# Patient Record
Sex: FEMALE | Race: WHITE | NOT HISPANIC OR LATINO | Employment: OTHER | URBAN - METROPOLITAN AREA
[De-identification: names, ages, dates, MRNs, and addresses within clinical notes are randomized per-mention and may not be internally consistent; named-entity substitution may affect disease eponyms.]

---

## 2019-05-02 ENCOUNTER — APPOINTMENT (OUTPATIENT)
Dept: CT IMAGING | Facility: HOSPITAL | Age: 84
End: 2019-05-02

## 2019-05-02 ENCOUNTER — HOSPITAL ENCOUNTER (INPATIENT)
Facility: HOSPITAL | Age: 84
LOS: 3 days | Discharge: HOME | End: 2019-05-05
Attending: EMERGENCY MEDICINE | Admitting: INTERNAL MEDICINE

## 2019-05-02 ENCOUNTER — APPOINTMENT (OUTPATIENT)
Dept: CARDIOLOGY | Facility: HOSPITAL | Age: 84
End: 2019-05-02

## 2019-05-02 ENCOUNTER — APPOINTMENT (OUTPATIENT)
Dept: GENERAL RADIOLOGY | Facility: HOSPITAL | Age: 84
End: 2019-05-02

## 2019-05-02 DIAGNOSIS — I50.9 ACUTE ON CHRONIC CONGESTIVE HEART FAILURE, UNSPECIFIED HEART FAILURE TYPE (HCC): Primary | ICD-10-CM

## 2019-05-02 DIAGNOSIS — R09.02 HYPOXIA: ICD-10-CM

## 2019-05-02 PROBLEM — E03.9 HYPOTHYROIDISM (ACQUIRED): Status: ACTIVE | Noted: 2019-05-02

## 2019-05-02 PROBLEM — R06.00 DYSPNEA: Status: ACTIVE | Noted: 2019-05-02

## 2019-05-02 PROBLEM — I48.91 ATRIAL FIBRILLATION (HCC): Status: ACTIVE | Noted: 2019-05-02

## 2019-05-02 PROBLEM — J20.9 ACUTE BRONCHITIS: Status: ACTIVE | Noted: 2019-05-02

## 2019-05-02 PROBLEM — F32.A DEPRESSION: Status: ACTIVE | Noted: 2019-05-02

## 2019-05-02 LAB
ALBUMIN SERPL-MCNC: 3.9 G/DL (ref 3.5–5.2)
ALBUMIN/GLOB SERPL: 1.1 G/DL
ALP SERPL-CCNC: 63 U/L (ref 39–117)
ALT SERPL W P-5'-P-CCNC: 19 U/L (ref 1–33)
ANION GAP SERPL CALCULATED.3IONS-SCNC: 16 MMOL/L
AST SERPL-CCNC: 31 U/L (ref 1–32)
B PARAPERT DNA SPEC QL NAA+PROBE: NOT DETECTED
B PERT DNA SPEC QL NAA+PROBE: NOT DETECTED
BASOPHILS # BLD AUTO: 0.02 10*3/MM3 (ref 0–0.2)
BASOPHILS NFR BLD AUTO: 0.3 % (ref 0–1.5)
BILIRUB SERPL-MCNC: 1.9 MG/DL (ref 0.2–1.2)
BUN BLD-MCNC: 32 MG/DL (ref 8–23)
BUN/CREAT SERPL: 28.6 (ref 7–25)
C PNEUM DNA NPH QL NAA+NON-PROBE: NOT DETECTED
CALCIUM SPEC-SCNC: 9 MG/DL (ref 8.6–10.5)
CHLORIDE SERPL-SCNC: 96 MMOL/L (ref 98–107)
CO2 SERPL-SCNC: 22 MMOL/L (ref 22–29)
CREAT BLD-MCNC: 1.12 MG/DL (ref 0.57–1)
D-LACTATE SERPL-SCNC: 1.9 MMOL/L (ref 0.5–2)
D-LACTATE SERPL-SCNC: 2.2 MMOL/L (ref 0.5–2)
DEPRECATED RDW RBC AUTO: 51 FL (ref 37–54)
EOSINOPHIL # BLD AUTO: 0 10*3/MM3 (ref 0–0.4)
EOSINOPHIL NFR BLD AUTO: 0 % (ref 0.3–6.2)
ERYTHROCYTE [DISTWIDTH] IN BLOOD BY AUTOMATED COUNT: 14.2 % (ref 12.3–15.4)
FLUAV H1 2009 PAND RNA NPH QL NAA+PROBE: NOT DETECTED
FLUAV H1 HA GENE NPH QL NAA+PROBE: NOT DETECTED
FLUAV H3 RNA NPH QL NAA+PROBE: NOT DETECTED
FLUAV SUBTYP SPEC NAA+PROBE: NOT DETECTED
FLUBV RNA ISLT QL NAA+PROBE: NOT DETECTED
GFR SERPL CREATININE-BSD FRML MDRD: 46 ML/MIN/1.73
GLOBULIN UR ELPH-MCNC: 3.7 GM/DL
GLUCOSE BLD-MCNC: 123 MG/DL (ref 65–99)
HADV DNA SPEC NAA+PROBE: NOT DETECTED
HCOV 229E RNA SPEC QL NAA+PROBE: NOT DETECTED
HCOV HKU1 RNA SPEC QL NAA+PROBE: NOT DETECTED
HCOV NL63 RNA SPEC QL NAA+PROBE: NOT DETECTED
HCOV OC43 RNA SPEC QL NAA+PROBE: NOT DETECTED
HCT VFR BLD AUTO: 39.8 % (ref 34–46.6)
HGB BLD-MCNC: 13.1 G/DL (ref 12–15.9)
HMPV RNA NPH QL NAA+NON-PROBE: NOT DETECTED
HOLD SPECIMEN: NORMAL
HPIV1 RNA SPEC QL NAA+PROBE: NOT DETECTED
HPIV2 RNA SPEC QL NAA+PROBE: NOT DETECTED
HPIV3 RNA NPH QL NAA+PROBE: DETECTED
HPIV4 P GENE NPH QL NAA+PROBE: NOT DETECTED
IMM GRANULOCYTES # BLD AUTO: 0.02 10*3/MM3 (ref 0–0.05)
IMM GRANULOCYTES NFR BLD AUTO: 0.3 % (ref 0–0.5)
LYMPHOCYTES # BLD AUTO: 0.82 10*3/MM3 (ref 0.7–3.1)
LYMPHOCYTES NFR BLD AUTO: 12.3 % (ref 19.6–45.3)
M PNEUMO IGG SER IA-ACNC: NOT DETECTED
MCH RBC QN AUTO: 32.3 PG (ref 26.6–33)
MCHC RBC AUTO-ENTMCNC: 32.9 G/DL (ref 31.5–35.7)
MCV RBC AUTO: 98.3 FL (ref 79–97)
MONOCYTES # BLD AUTO: 0.49 10*3/MM3 (ref 0.1–0.9)
MONOCYTES NFR BLD AUTO: 7.4 % (ref 5–12)
NEUTROPHILS # BLD AUTO: 5.31 10*3/MM3 (ref 1.7–7)
NEUTROPHILS NFR BLD AUTO: 80 % (ref 42.7–76)
NT-PROBNP SERPL-MCNC: ABNORMAL PG/ML (ref 5–1800)
PLATELET # BLD AUTO: 133 10*3/MM3 (ref 140–450)
PMV BLD AUTO: 11.3 FL (ref 6–12)
POTASSIUM BLD-SCNC: 3.2 MMOL/L (ref 3.5–5.2)
PROT SERPL-MCNC: 7.6 G/DL (ref 6–8.5)
RBC # BLD AUTO: 4.05 10*6/MM3 (ref 3.77–5.28)
RHINOVIRUS RNA SPEC NAA+PROBE: NOT DETECTED
RSV RNA NPH QL NAA+NON-PROBE: NOT DETECTED
SODIUM BLD-SCNC: 134 MMOL/L (ref 136–145)
TROPONIN T SERPL-MCNC: <0.01 NG/ML (ref 0–0.03)
WBC NRBC COR # BLD: 6.64 10*3/MM3 (ref 3.4–10.8)
WHOLE BLOOD HOLD SPECIMEN: NORMAL
WHOLE BLOOD HOLD SPECIMEN: NORMAL

## 2019-05-02 PROCEDURE — 87798 DETECT AGENT NOS DNA AMP: CPT | Performed by: HOSPITALIST

## 2019-05-02 PROCEDURE — 87150 DNA/RNA AMPLIFIED PROBE: CPT | Performed by: EMERGENCY MEDICINE

## 2019-05-02 PROCEDURE — 85025 COMPLETE CBC W/AUTO DIFF WBC: CPT | Performed by: EMERGENCY MEDICINE

## 2019-05-02 PROCEDURE — 84484 ASSAY OF TROPONIN QUANT: CPT | Performed by: EMERGENCY MEDICINE

## 2019-05-02 PROCEDURE — 94799 UNLISTED PULMONARY SVC/PX: CPT

## 2019-05-02 PROCEDURE — 87633 RESP VIRUS 12-25 TARGETS: CPT | Performed by: HOSPITALIST

## 2019-05-02 PROCEDURE — 71250 CT THORAX DX C-: CPT

## 2019-05-02 PROCEDURE — 25010000002 AZITHROMYCIN PER 500 MG: Performed by: EMERGENCY MEDICINE

## 2019-05-02 PROCEDURE — 99222 1ST HOSP IP/OBS MODERATE 55: CPT | Performed by: HOSPITALIST

## 2019-05-02 PROCEDURE — 71045 X-RAY EXAM CHEST 1 VIEW: CPT

## 2019-05-02 PROCEDURE — 87040 BLOOD CULTURE FOR BACTERIA: CPT | Performed by: EMERGENCY MEDICINE

## 2019-05-02 PROCEDURE — 25010000002 HEPARIN (PORCINE) PER 1000 UNITS: Performed by: HOSPITALIST

## 2019-05-02 PROCEDURE — 87581 M.PNEUMON DNA AMP PROBE: CPT | Performed by: HOSPITALIST

## 2019-05-02 PROCEDURE — 25010000002 CEFTRIAXONE PER 250 MG: Performed by: EMERGENCY MEDICINE

## 2019-05-02 PROCEDURE — 94640 AIRWAY INHALATION TREATMENT: CPT

## 2019-05-02 PROCEDURE — 83880 ASSAY OF NATRIURETIC PEPTIDE: CPT | Performed by: EMERGENCY MEDICINE

## 2019-05-02 PROCEDURE — 83605 ASSAY OF LACTIC ACID: CPT | Performed by: EMERGENCY MEDICINE

## 2019-05-02 PROCEDURE — 80053 COMPREHEN METABOLIC PANEL: CPT | Performed by: EMERGENCY MEDICINE

## 2019-05-02 PROCEDURE — 93306 TTE W/DOPPLER COMPLETE: CPT

## 2019-05-02 PROCEDURE — 93005 ELECTROCARDIOGRAM TRACING: CPT | Performed by: EMERGENCY MEDICINE

## 2019-05-02 PROCEDURE — 87147 CULTURE TYPE IMMUNOLOGIC: CPT | Performed by: EMERGENCY MEDICINE

## 2019-05-02 PROCEDURE — 93005 ELECTROCARDIOGRAM TRACING: CPT

## 2019-05-02 PROCEDURE — 99285 EMERGENCY DEPT VISIT HI MDM: CPT

## 2019-05-02 PROCEDURE — 87486 CHLMYD PNEUM DNA AMP PROBE: CPT | Performed by: HOSPITALIST

## 2019-05-02 RX ORDER — IPRATROPIUM BROMIDE AND ALBUTEROL SULFATE 2.5; .5 MG/3ML; MG/3ML
3 SOLUTION RESPIRATORY (INHALATION)
Status: DISCONTINUED | OUTPATIENT
Start: 2019-05-02 | End: 2019-05-05 | Stop reason: HOSPADM

## 2019-05-02 RX ORDER — SERTRALINE HYDROCHLORIDE 100 MG/1
100 TABLET, FILM COATED ORAL DAILY
Status: DISCONTINUED | OUTPATIENT
Start: 2019-05-02 | End: 2019-05-05 | Stop reason: HOSPADM

## 2019-05-02 RX ORDER — PANTOPRAZOLE SODIUM 40 MG/1
40 TABLET, DELAYED RELEASE ORAL DAILY
Status: DISCONTINUED | OUTPATIENT
Start: 2019-05-02 | End: 2019-05-05 | Stop reason: HOSPADM

## 2019-05-02 RX ORDER — SODIUM CHLORIDE 0.9 % (FLUSH) 0.9 %
3 SYRINGE (ML) INJECTION EVERY 12 HOURS SCHEDULED
Status: DISCONTINUED | OUTPATIENT
Start: 2019-05-02 | End: 2019-05-05 | Stop reason: HOSPADM

## 2019-05-02 RX ORDER — SERTRALINE HYDROCHLORIDE 100 MG/1
100 TABLET, FILM COATED ORAL DAILY
COMMUNITY

## 2019-05-02 RX ORDER — LEVOTHYROXINE SODIUM 0.05 MG/1
50 TABLET ORAL
Status: DISCONTINUED | OUTPATIENT
Start: 2019-05-03 | End: 2019-05-05 | Stop reason: HOSPADM

## 2019-05-02 RX ORDER — LEVOTHYROXINE SODIUM 0.05 MG/1
50 TABLET ORAL DAILY
COMMUNITY

## 2019-05-02 RX ORDER — FUROSEMIDE 10 MG/ML
40 INJECTION INTRAMUSCULAR; INTRAVENOUS ONCE
Status: DISCONTINUED | OUTPATIENT
Start: 2019-05-02 | End: 2019-05-02

## 2019-05-02 RX ORDER — GABAPENTIN 100 MG/1
200 CAPSULE ORAL NIGHTLY
COMMUNITY

## 2019-05-02 RX ORDER — SODIUM CHLORIDE 0.9 % (FLUSH) 0.9 %
10 SYRINGE (ML) INJECTION AS NEEDED
Status: DISCONTINUED | OUTPATIENT
Start: 2019-05-02 | End: 2019-05-05 | Stop reason: HOSPADM

## 2019-05-02 RX ORDER — CEFTRIAXONE SODIUM 1 G/50ML
1 INJECTION, SOLUTION INTRAVENOUS ONCE
Status: COMPLETED | OUTPATIENT
Start: 2019-05-02 | End: 2019-05-02

## 2019-05-02 RX ORDER — CARVEDILOL 6.25 MG/1
6.25 TABLET ORAL 2 TIMES DAILY WITH MEALS
Status: DISCONTINUED | OUTPATIENT
Start: 2019-05-02 | End: 2019-05-03

## 2019-05-02 RX ORDER — GUAIFENESIN 600 MG/1
600 TABLET, EXTENDED RELEASE ORAL EVERY 12 HOURS SCHEDULED
Status: DISCONTINUED | OUTPATIENT
Start: 2019-05-02 | End: 2019-05-05 | Stop reason: HOSPADM

## 2019-05-02 RX ORDER — LANOLIN ALCOHOL/MO/W.PET/CERES
1000 CREAM (GRAM) TOPICAL DAILY
COMMUNITY

## 2019-05-02 RX ORDER — POTASSIUM CHLORIDE 7.45 MG/ML
10 INJECTION INTRAVENOUS
Status: DISCONTINUED | OUTPATIENT
Start: 2019-05-02 | End: 2019-05-05 | Stop reason: HOSPADM

## 2019-05-02 RX ORDER — CARVEDILOL 6.25 MG/1
6.25 TABLET ORAL 2 TIMES DAILY WITH MEALS
COMMUNITY
End: 2019-05-05 | Stop reason: HOSPADM

## 2019-05-02 RX ORDER — IPRATROPIUM BROMIDE AND ALBUTEROL SULFATE 2.5; .5 MG/3ML; MG/3ML
3 SOLUTION RESPIRATORY (INHALATION) EVERY 4 HOURS PRN
Status: DISCONTINUED | OUTPATIENT
Start: 2019-05-02 | End: 2019-05-05 | Stop reason: HOSPADM

## 2019-05-02 RX ORDER — DOXYCYCLINE 100 MG/1
100 CAPSULE ORAL EVERY 12 HOURS SCHEDULED
Status: DISCONTINUED | OUTPATIENT
Start: 2019-05-02 | End: 2019-05-04

## 2019-05-02 RX ORDER — IPRATROPIUM BROMIDE AND ALBUTEROL SULFATE 2.5; .5 MG/3ML; MG/3ML
3 SOLUTION RESPIRATORY (INHALATION) ONCE
Status: COMPLETED | OUTPATIENT
Start: 2019-05-02 | End: 2019-05-02

## 2019-05-02 RX ORDER — POTASSIUM CHLORIDE 750 MG/1
40 CAPSULE, EXTENDED RELEASE ORAL AS NEEDED
Status: DISCONTINUED | OUTPATIENT
Start: 2019-05-02 | End: 2019-05-05 | Stop reason: HOSPADM

## 2019-05-02 RX ORDER — MAGNESIUM SULFATE HEPTAHYDRATE 40 MG/ML
4 INJECTION, SOLUTION INTRAVENOUS AS NEEDED
Status: DISCONTINUED | OUTPATIENT
Start: 2019-05-02 | End: 2019-05-05 | Stop reason: HOSPADM

## 2019-05-02 RX ORDER — BUMETANIDE 0.5 MG/1
0.5 TABLET ORAL DAILY
Status: DISCONTINUED | OUTPATIENT
Start: 2019-05-02 | End: 2019-05-03

## 2019-05-02 RX ORDER — MAGNESIUM SULFATE HEPTAHYDRATE 40 MG/ML
2 INJECTION, SOLUTION INTRAVENOUS AS NEEDED
Status: DISCONTINUED | OUTPATIENT
Start: 2019-05-02 | End: 2019-05-05 | Stop reason: HOSPADM

## 2019-05-02 RX ORDER — HYDROCHLOROTHIAZIDE 12.5 MG/1
12.5 TABLET ORAL DAILY
COMMUNITY
End: 2019-05-05 | Stop reason: HOSPADM

## 2019-05-02 RX ORDER — HEPARIN SODIUM 5000 [USP'U]/ML
5000 INJECTION, SOLUTION INTRAVENOUS; SUBCUTANEOUS EVERY 8 HOURS SCHEDULED
Status: DISCONTINUED | OUTPATIENT
Start: 2019-05-02 | End: 2019-05-05 | Stop reason: HOSPADM

## 2019-05-02 RX ORDER — SACCHAROMYCES BOULARDII 250 MG
250 CAPSULE ORAL 2 TIMES DAILY
Status: DISCONTINUED | OUTPATIENT
Start: 2019-05-02 | End: 2019-05-05 | Stop reason: HOSPADM

## 2019-05-02 RX ORDER — ASPIRIN 81 MG/1
81 TABLET ORAL DAILY
COMMUNITY

## 2019-05-02 RX ORDER — CEFTRIAXONE SODIUM 1 G/50ML
1 INJECTION, SOLUTION INTRAVENOUS EVERY 24 HOURS
Status: DISCONTINUED | OUTPATIENT
Start: 2019-05-02 | End: 2019-05-02

## 2019-05-02 RX ORDER — CEFTRIAXONE SODIUM 1 G/50ML
1 INJECTION, SOLUTION INTRAVENOUS EVERY 24 HOURS
Status: DISCONTINUED | OUTPATIENT
Start: 2019-05-03 | End: 2019-05-04

## 2019-05-02 RX ORDER — BUMETANIDE 0.25 MG/ML
2 INJECTION INTRAMUSCULAR; INTRAVENOUS ONCE
Status: COMPLETED | OUTPATIENT
Start: 2019-05-02 | End: 2019-05-02

## 2019-05-02 RX ORDER — PANTOPRAZOLE SODIUM 40 MG/1
40 TABLET, DELAYED RELEASE ORAL DAILY
COMMUNITY

## 2019-05-02 RX ORDER — POTASSIUM CHLORIDE 1.5 G/1.77G
40 POWDER, FOR SOLUTION ORAL AS NEEDED
Status: DISCONTINUED | OUTPATIENT
Start: 2019-05-02 | End: 2019-05-05 | Stop reason: HOSPADM

## 2019-05-02 RX ORDER — LEVOTHYROXINE SODIUM 0.05 MG/1
50 TABLET ORAL DAILY
Status: DISCONTINUED | OUTPATIENT
Start: 2019-05-02 | End: 2019-05-02

## 2019-05-02 RX ORDER — HYDROCHLOROTHIAZIDE 12.5 MG/1
12.5 TABLET ORAL DAILY
Status: DISCONTINUED | OUTPATIENT
Start: 2019-05-02 | End: 2019-05-03

## 2019-05-02 RX ORDER — FOLIC ACID 0.8 MG
500 TABLET ORAL DAILY
COMMUNITY

## 2019-05-02 RX ORDER — SODIUM CHLORIDE 0.9 % (FLUSH) 0.9 %
3-10 SYRINGE (ML) INJECTION AS NEEDED
Status: DISCONTINUED | OUTPATIENT
Start: 2019-05-02 | End: 2019-05-05 | Stop reason: HOSPADM

## 2019-05-02 RX ORDER — ASPIRIN 81 MG/1
81 TABLET ORAL DAILY
Status: DISCONTINUED | OUTPATIENT
Start: 2019-05-02 | End: 2019-05-05 | Stop reason: HOSPADM

## 2019-05-02 RX ORDER — BUMETANIDE 1 MG/1
0.5 TABLET ORAL DAILY
COMMUNITY

## 2019-05-02 RX ADMIN — IPRATROPIUM BROMIDE AND ALBUTEROL SULFATE 3 ML: 2.5; .5 SOLUTION RESPIRATORY (INHALATION) at 12:25

## 2019-05-02 RX ADMIN — SODIUM CHLORIDE, PRESERVATIVE FREE 3 ML: 5 INJECTION INTRAVENOUS at 21:28

## 2019-05-02 RX ADMIN — BUMETANIDE 2 MG: 0.25 INJECTION INTRAMUSCULAR; INTRAVENOUS at 13:54

## 2019-05-02 RX ADMIN — GUAIFENESIN 600 MG: 600 TABLET, EXTENDED RELEASE ORAL at 21:28

## 2019-05-02 RX ADMIN — BUMETANIDE 0.5 MG: 0.5 TABLET ORAL at 21:25

## 2019-05-02 RX ADMIN — GUAIFENESIN 600 MG: 600 TABLET, EXTENDED RELEASE ORAL at 18:27

## 2019-05-02 RX ADMIN — AZITHROMYCIN MONOHYDRATE 500 MG: 500 INJECTION, POWDER, LYOPHILIZED, FOR SOLUTION INTRAVENOUS at 13:58

## 2019-05-02 RX ADMIN — Medication 250 MG: at 21:25

## 2019-05-02 RX ADMIN — HEPARIN SODIUM 5000 UNITS: 5000 INJECTION INTRAVENOUS; SUBCUTANEOUS at 21:27

## 2019-05-02 RX ADMIN — POTASSIUM CHLORIDE 40 MEQ: 750 CAPSULE, EXTENDED RELEASE ORAL at 16:53

## 2019-05-02 RX ADMIN — ASPIRIN 81 MG: 81 TABLET, COATED ORAL at 21:26

## 2019-05-02 RX ADMIN — HYDROCHLOROTHIAZIDE 12.5 MG: 12.5 TABLET ORAL at 21:27

## 2019-05-02 RX ADMIN — CEFTRIAXONE SODIUM 1 G: 1 INJECTION, SOLUTION INTRAVENOUS at 12:59

## 2019-05-02 RX ADMIN — DOXYCYCLINE 100 MG: 100 CAPSULE ORAL at 21:28

## 2019-05-02 RX ADMIN — CARVEDILOL 6.25 MG: 6.25 TABLET, FILM COATED ORAL at 21:29

## 2019-05-02 RX ADMIN — IPRATROPIUM BROMIDE AND ALBUTEROL SULFATE 3 ML: 2.5; .5 SOLUTION RESPIRATORY (INHALATION) at 20:45

## 2019-05-02 RX ADMIN — POTASSIUM CHLORIDE 40 MEQ: 750 CAPSULE, EXTENDED RELEASE ORAL at 21:26

## 2019-05-02 NOTE — ED PROVIDER NOTES
Subjective   Liz Lester is an 85 y.o. female who presents to the ED with complaints of shortness of breath. The patient reports that she has been coughing and experiencing a sore throat for the past week. She states that she went to the [facility] 3 days ago and was told to take Mucinex and Flonase in order to ease her symptoms. She reports that Mucinex and Flonase originally lessened her symptoms, but she began to feel worse and more short of breath so she went back to the [facility] today where she had an Xray. She was told that she had pneumonia and was given a nebulizer treatment along with antibiotics. She also states that she has been feeling nausea, but denies vomiting and fever. She is not on oxygen at home. She has CHF and takes Bumex daily. She had a Watchman procedure on her left side a year ago. There are no other acute complaints at this time.         History provided by:  Patient  Shortness of Breath   Severity:  Moderate  Onset quality:  Gradual  Duration:  1 week  Chronicity:  New  Relieved by:  Nothing  Associated symptoms: cough and sore throat    Associated symptoms: no fever and no vomiting        Review of Systems   Constitutional: Negative for fever.   HENT: Positive for sore throat.    Respiratory: Positive for cough and shortness of breath.    Gastrointestinal: Positive for nausea. Negative for vomiting.       Past Medical History:   Diagnosis Date   • CHF (congestive heart failure) (CMS/[facility])        No Known Allergies    History reviewed. No pertinent surgical history.    History reviewed. No pertinent family history.    Social History     Socioeconomic History   • Marital status:      Spouse name: Not on file   • Number of children: Not on file   • Years of education: Not on file   • Highest education level: Not on file   Tobacco Use   • Smoking status: Former Smoker   • Smokeless tobacco: Never Used   • Tobacco comment: QUIT 30 YEARS AGO    Substance and Sexual Activity   • Alcohol use: Yes      Alcohol/week: 4.2 oz     Types: 7 Glasses of wine per week     Frequency: Never   • Sexual activity: No         Objective   Physical Exam   Constitutional: She is oriented to person, place, and time. She appears well-developed and well-nourished. No distress.   HENT:   Head: Normocephalic and atraumatic.   Eyes: Conjunctivae are normal. No scleral icterus.   Neck: Normal range of motion. Neck supple.   Cardiovascular: Normal rate and normal heart sounds. An irregularly irregular rhythm present.   Pulmonary/Chest: Effort normal. Tachypnea noted. She has no wheezes. She has rales.   Rales in right are greater than on the left.   Mild respiratory distress.   Abdominal: Soft. There is no tenderness.   Musculoskeletal: Normal range of motion. She exhibits edema.   2+ edema to lower extremities.    Neurological: She is alert and oriented to person, place, and time.   Skin: Skin is warm and dry.   Psychiatric: She has a normal mood and affect. Her behavior is normal.   Nursing note and vitals reviewed.      Procedures         ED Course  ED Course as of May 02 1416   Thu May 02, 2019   1412 I spoke with Dr. Verma.  H&P given.  Agrees with plan for admission.  I discussed this case with Dr. Kody Botello.  He agreed with admission.  We will obtain a CT evaluation of the chest.  [JI]   1414 Antibiotics initiated earlier given the history of pneumonia and the reading reviewed by myself which suggested a retrocardiac pneumonia.  [JI]      ED Course User Index  [JI] Layton Gutierrez PA         Recent Results (from the past 24 hour(s))   Comprehensive Metabolic Panel    Collection Time: 05/02/19 11:19 AM   Result Value Ref Range    Glucose 123 (H) 65 - 99 mg/dL    BUN 32 (H) 8 - 23 mg/dL    Creatinine 1.12 (H) 0.57 - 1.00 mg/dL    Sodium 134 (L) 136 - 145 mmol/L    Potassium 3.2 (L) 3.5 - 5.2 mmol/L    Chloride 96 (L) 98 - 107 mmol/L    CO2 22.0 22.0 - 29.0 mmol/L    Calcium 9.0 8.6 - 10.5 mg/dL    Total Protein 7.6 6.0 - 8.5 g/dL     Albumin 3.90 3.50 - 5.20 g/dL    ALT (SGPT) 19 1 - 33 U/L    AST (SGOT) 31 1 - 32 U/L    Alkaline Phosphatase 63 39 - 117 U/L    Total Bilirubin 1.9 (H) 0.2 - 1.2 mg/dL    eGFR Non African Amer 46 (L) >60 mL/min/1.73    Globulin 3.7 gm/dL    A/G Ratio 1.1 g/dL    BUN/Creatinine Ratio 28.6 (H) 7.0 - 25.0    Anion Gap 16.0 mmol/L   BNP    Collection Time: 05/02/19 11:19 AM   Result Value Ref Range    proBNP 18,779.0 (H) 5.0-1,800.0 pg/mL   Troponin    Collection Time: 05/02/19 11:19 AM   Result Value Ref Range    Troponin T <0.010 0.000 - 0.030 ng/mL   Light Blue Top    Collection Time: 05/02/19 11:19 AM   Result Value Ref Range    Extra Tube hold for add-on    Green Top (Gel)    Collection Time: 05/02/19 11:19 AM   Result Value Ref Range    Extra Tube Hold for add-ons.    Lavender Top    Collection Time: 05/02/19 11:19 AM   Result Value Ref Range    Extra Tube hold for add-on    Gold Top - SST    Collection Time: 05/02/19 11:19 AM   Result Value Ref Range    Extra Tube Hold for add-ons.    CBC Auto Differential    Collection Time: 05/02/19 11:19 AM   Result Value Ref Range    WBC 6.64 3.40 - 10.80 10*3/mm3    RBC 4.05 3.77 - 5.28 10*6/mm3    Hemoglobin 13.1 12.0 - 15.9 g/dL    Hematocrit 39.8 34.0 - 46.6 %    MCV 98.3 (H) 79.0 - 97.0 fL    MCH 32.3 26.6 - 33.0 pg    MCHC 32.9 31.5 - 35.7 g/dL    RDW 14.2 12.3 - 15.4 %    RDW-SD 51.0 37.0 - 54.0 fl    MPV 11.3 6.0 - 12.0 fL    Platelets 133 (L) 140 - 450 10*3/mm3    Neutrophil % 80.0 (H) 42.7 - 76.0 %    Lymphocyte % 12.3 (L) 19.6 - 45.3 %    Monocyte % 7.4 5.0 - 12.0 %    Eosinophil % 0.0 (L) 0.3 - 6.2 %    Basophil % 0.3 0.0 - 1.5 %    Immature Grans % 0.3 0.0 - 0.5 %    Neutrophils, Absolute 5.31 1.70 - 7.00 10*3/mm3    Lymphocytes, Absolute 0.82 0.70 - 3.10 10*3/mm3    Monocytes, Absolute 0.49 0.10 - 0.90 10*3/mm3    Eosinophils, Absolute 0.00 0.00 - 0.40 10*3/mm3    Basophils, Absolute 0.02 0.00 - 0.20 10*3/mm3    Immature Grans, Absolute 0.02 0.00 - 0.05  10*3/mm3   Lactic Acid, Plasma    Collection Time: 05/02/19 12:48 PM   Result Value Ref Range    Lactate 2.2 (C) 0.5 - 2.0 mmol/L     Note: In addition to lab results from this visit, the labs listed above may include labs taken at another facility or during a different encounter within the last 24 hours. Please correlate lab times with ED admission and discharge times for further clarification of the services performed during this visit.    XR Chest 1 View   Preliminary Result   1. Cardiomegaly and mild pulmonary vascular congestion.   2. Mild diffuse interstitial disease, nonspecific. Very early   interstitial edema might have this appearance. Bronchitis could appear   similar. No focal pneumonia is identified.       D:  05/02/2019   E:  05/02/2019          CT Chest Without Contrast    (Results Pending)     Vitals:    05/02/19 1200 05/02/19 1225 05/02/19 1230 05/02/19 1352   BP: 113/95  109/91 118/77   BP Location:       Patient Position:       Pulse: 99 107 93 95   Resp:  20 20 20   Temp:       TempSrc:       SpO2: 93% 93% 96% 93%   Weight:       Height:         Medications   sodium chloride 0.9 % flush 10 mL (not administered)   azithromycin 500 MG/250 ML 0.9% NS IVPB (MBP) (500 mg Intravenous New Bag 5/2/19 1358)   saccharomyces boulardii (FLORASTOR) capsule 250 mg (not administered)   guaiFENesin (MUCINEX) 12 hr tablet 600 mg (not administered)   cefTRIAXone (ROCEPHIN) IVPB 1 g (0 g Intravenous Stopped 5/2/19 1330)   ipratropium-albuterol (DUO-NEB) nebulizer solution 3 mL (3 mL Nebulization Given 5/2/19 1225)   bumetanide (BUMEX) injection 2 mg (2 mg Intravenous Given 5/2/19 1354)     ECG/EMG Results (last 24 hours)     Procedure Component Value Units Date/Time    ECG 12 Lead [[id number]] Collected:  05/02/19 1030     Updated:  05/02/19 1029        ECG 12 Lead         ECG 12 Lead    (Results Pending)                   MDM    Final diagnoses:   Acute on chronic congestive heart failure, unspecified heart failure  type (CMS/HCC)   Hypoxia       Documentation assistance provided by tra Richardson.  Information recorded by the rachelleibmatt was done at my direction and has been verified and validated by me.     Desirae Richardson  05/02/19 1203       Desirae Richardson  05/02/19 1301       Layton Gutierrez PA  05/02/19 [id number]

## 2019-05-02 NOTE — H&P
[facility] Medicine Services  HISTORY AND PHYSICAL    Patient Name: Liz Lester  : 1934  MRN: [id number]  Primary Care Physician: Heather, No Known  Date of admission: 2019      Subjective   Subjective     Chief Complaint:  Dyspnea    HPI:  Liz Lester is a 85 y.o. female with history of CHF and atrial fibrillation s/p Watchman's  here with progressive dyspnea for 2-3 days with cough/congestin/weakness. Cough with yellow sputum. No f/c. LUNDBERG. No PND/orthopnea. No LE edema. Nausea, but no emesis. Anorexia noted. Urinary incontinence is chronic. No diarrhea.     Review of Systems   Constitutional: Positive for activity change, appetite change and fatigue.   HENT: Positive for congestion.    Respiratory: Positive for cough, shortness of breath and wheezing.    Cardiovascular: Negative.    Gastrointestinal: Positive for nausea.   Genitourinary: Negative.    Musculoskeletal: Negative.    Skin: Negative.    Neurological: Positive for weakness and light-headedness.   Hematological: Negative.    Psychiatric/Behavioral: Positive for dysphoric mood.        Otherwise complete ROS reviewed and is negative except as mentioned in the HPI.    Personal History     Past Medical History:   Diagnosis Date   • CHF (congestive heart failure) (CMS/HCC)        History reviewed. No pertinent surgical history.    Family History: family history is not on file. Otherwise pertinent FHx was reviewed and unremarkable.     Social History:  reports that she has quit smoking. She has never used smokeless tobacco. She reports that she drinks about 4.2 oz of alcohol per week.  Social History     Social History Narrative   • Not on file       Medications:    Available home medication information reviewed.    (Not in a hospital admission)    No Known Allergies    Objective   Objective     Vital Signs:   Temp:  [98.8 °F (37.1 °C)] 98.8 °F (37.1 °C)  Heart Rate:  [] 95  Resp:  [18-20] 20  BP:  (105-118)/(66-95) 118/77        Physical Exam   NAD, alert and oriented, tired appearing  OP clear, MMM  Neck supple  No LAD  Tachy  Coarse B, no wheezing, no rales  +BS, ND, NT  WILLIS, but weak  No LE edema  No obvious rashes  Flat affect    Results Reviewed:  I have personally reviewed current lab, radiology, and data and agree.    Results from last 7 days   Lab Units 05/02/19  1119   WBC 10*3/mm3 6.64   HEMOGLOBIN g/dL 13.1   HEMATOCRIT % 39.8   PLATELETS 10*3/mm3 133*     Results from last 7 days   Lab Units 05/02/19  1119   SODIUM mmol/L 134*   POTASSIUM mmol/L 3.2*   CHLORIDE mmol/L 96*   CO2 mmol/L 22.0   BUN mg/dL 32*   CREATININE mg/dL 1.12*   GLUCOSE mg/dL 123*   CALCIUM mg/dL 9.0   ALT (SGPT) U/L 19   AST (SGOT) U/L 31     Estimated Creatinine Clearance: 39.8 mL/min (A) (by C-G formula based on SCr of 1.12 mg/dL (H)).  Brief Urine Lab Results     None        No results found for: BNP  Imaging Results (last 24 hours)     Procedure Component Value Units Date/Time    CT Chest Without Contrast [[id number]] Updated:  05/02/19 1425    XR Chest 1 View [[id number]] Collected:  05/02/19 1307     Updated:  05/02/19 1307    Narrative:       EXAMINATION: XR CHEST 1 VW-      INDICATION: Cough, shortness of air.      COMPARISON: None.     FINDINGS: Heart is enlarged. Vasculature is cephalized. Mild diffuse  interstitial changes are nonspecific but could represent early  interstitial edema. There may be minimal pleural effusion. No lung  consolidation or pneumothorax is seen.           Impression:       1. Cardiomegaly and mild pulmonary vascular congestion.  2. Mild diffuse interstitial disease, nonspecific. Very early  interstitial edema might have this appearance. Bronchitis could appear  similar. No focal pneumonia is identified.     D:  05/02/2019  E:  05/02/2019                Assessment/Plan   Assessment / Plan     Active Hospital Problems    Diagnosis POA   • **Dyspnea [R06.00] Yes   • CHF (congestive heart failure)  (CMS/[facility]) [I50.9] Unknown   • Hypothyroidism (acquired) [E03.9] Unknown   • Depression [F32.9] Unknown   • Atrial fibrillation (CMS/[facility]) [I48.91] Unknown   • Acute bronchitis [J20.9] Unknown     Progressive dyspnea, likely bronchitis v. Pneumonia, CT chest pending    Bronchitis/PNA  --Rocephin/doxy  --nebs  --CT pending  --respiratory pcr, sputum culture  Hx of CHF, with pulmonary vascular congestion on CXR  --no edema, no PND/orthopnea, however  --s/p diuretics, ECHO pending  Hx of Afib  --s/p Watchman's  HTN  Depression  Hypothyroidism    DVT prophylaxis:  BOOKER    CODE STATUS:    Code Status and Medical Interventions:   Ordered at: 05/02/19 1413     Level Of Support Discussed With:    Patient     Code Status:    CPR     Medical Interventions (Level of Support Prior to Arrest):    Full       Admission Status:  I believe this patient meets observation criteria.      Electronically signed by Byron Botello MD, 05/02/19, 2:32 PM.

## 2019-05-03 ENCOUNTER — APPOINTMENT (OUTPATIENT)
Dept: GENERAL RADIOLOGY | Facility: HOSPITAL | Age: 84
End: 2019-05-03

## 2019-05-03 ENCOUNTER — APPOINTMENT (OUTPATIENT)
Dept: CARDIOLOGY | Facility: HOSPITAL | Age: 84
End: 2019-05-03

## 2019-05-03 PROBLEM — J12.2 PARAINFLUENZA VIRUS PNEUMONIA: Status: ACTIVE | Noted: 2019-05-02

## 2019-05-03 PROBLEM — E87.6 HYPOKALEMIA: Status: ACTIVE | Noted: 2019-05-03

## 2019-05-03 PROBLEM — J12.9 VIRAL PNEUMONIA: Status: ACTIVE | Noted: 2019-05-02

## 2019-05-03 LAB
ANION GAP SERPL CALCULATED.3IONS-SCNC: 12 MMOL/L
BACTERIA BLD CULT: ABNORMAL
BH CV ECHO MEAS - AO ROOT AREA (BSA CORRECTED): 1.6
BH CV ECHO MEAS - AO ROOT AREA: 7.2 CM^2
BH CV ECHO MEAS - AO ROOT DIAM: 3 CM
BH CV ECHO MEAS - BSA(HAYCOCK): 2 M^2
BH CV ECHO MEAS - BSA: 1.9 M^2
BH CV ECHO MEAS - BZI_BMI: 27.4 KILOGRAMS/M^2
BH CV ECHO MEAS - BZI_METRIC_HEIGHT: 170.2 CM
BH CV ECHO MEAS - BZI_METRIC_WEIGHT: 79.4 KG
BH CV ECHO MEAS - EDV(CUBED): 122.5 ML
BH CV ECHO MEAS - EDV(MOD-SP2): 112 ML
BH CV ECHO MEAS - EDV(MOD-SP4): 94 ML
BH CV ECHO MEAS - EDV(TEICH): 116.4 ML
BH CV ECHO MEAS - EF(CUBED): 44.8 %
BH CV ECHO MEAS - EF(MOD-BP): 38 %
BH CV ECHO MEAS - EF(MOD-SP2): 36.6 %
BH CV ECHO MEAS - EF(MOD-SP4): 35.1 %
BH CV ECHO MEAS - EF(TEICH): 37.2 %
BH CV ECHO MEAS - ESV(CUBED): 67.6 ML
BH CV ECHO MEAS - ESV(MOD-SP2): 71 ML
BH CV ECHO MEAS - ESV(MOD-SP4): 61 ML
BH CV ECHO MEAS - ESV(TEICH): 73.1 ML
BH CV ECHO MEAS - FS: 18 %
BH CV ECHO MEAS - IVS/LVPW: 0.86
BH CV ECHO MEAS - IVSD: 1 CM
BH CV ECHO MEAS - LA DIMENSION: 4.6 CM
BH CV ECHO MEAS - LA/AO: 1.5
BH CV ECHO MEAS - LAD MAJOR: 7.7 CM
BH CV ECHO MEAS - LAT PEAK E' VEL: 11.6 CM/SEC
BH CV ECHO MEAS - LATERAL E/E' RATIO: 7.6
BH CV ECHO MEAS - LV DIASTOLIC VOL/BSA (35-75): 49.2 ML/M^2
BH CV ECHO MEAS - LV MASS(C)D: 204.8 GRAMS
BH CV ECHO MEAS - LV MASS(C)DI: 107.2 GRAMS/M^2
BH CV ECHO MEAS - LV MAX PG: 3.3 MMHG
BH CV ECHO MEAS - LV MEAN PG: 1.3 MMHG
BH CV ECHO MEAS - LV SYSTOLIC VOL/BSA (12-30): 31.9 ML/M^2
BH CV ECHO MEAS - LV V1 MAX: 91.3 CM/SEC
BH CV ECHO MEAS - LV V1 MEAN: 50.1 CM/SEC
BH CV ECHO MEAS - LV V1 VTI: 14.3 CM
BH CV ECHO MEAS - LVIDD: 5 CM
BH CV ECHO MEAS - LVIDS: 4.1 CM
BH CV ECHO MEAS - LVLD AP2: 7.3 CM
BH CV ECHO MEAS - LVLD AP4: 8.2 CM
BH CV ECHO MEAS - LVLS AP2: 6.9 CM
BH CV ECHO MEAS - LVLS AP4: 7.1 CM
BH CV ECHO MEAS - LVOT AREA (M): 3.1 CM^2
BH CV ECHO MEAS - LVOT AREA: 3.3 CM^2
BH CV ECHO MEAS - LVOT DIAM: 2 CM
BH CV ECHO MEAS - LVPWD: 1.2 CM
BH CV ECHO MEAS - MED PEAK E' VEL: 6.7 CM/SEC
BH CV ECHO MEAS - MEDIAL E/E' RATIO: 13.1
BH CV ECHO MEAS - MR MAX PG: 93 MMHG
BH CV ECHO MEAS - MR MAX VEL: 481.2 CM/SEC
BH CV ECHO MEAS - MR MEAN PG: 51.1 MMHG
BH CV ECHO MEAS - MR MEAN VEL: 315.7 CM/SEC
BH CV ECHO MEAS - MR VTI: 136.7 CM
BH CV ECHO MEAS - MV A MAX VEL: 37 CM/SEC
BH CV ECHO MEAS - MV DEC TIME: 0.19 SEC
BH CV ECHO MEAS - MV E MAX VEL: 89.8 CM/SEC
BH CV ECHO MEAS - MV E/A: 2.4
BH CV ECHO MEAS - RAP SYSTOLE: 8 MMHG
BH CV ECHO MEAS - RVDD: 2.8 CM
BH CV ECHO MEAS - RVSP: 27 MMHG
BH CV ECHO MEAS - SI(CUBED): 28.7 ML/M^2
BH CV ECHO MEAS - SI(LVOT): 24.4 ML/M^2
BH CV ECHO MEAS - SI(MOD-SP2): 21.5 ML/M^2
BH CV ECHO MEAS - SI(MOD-SP4): 17.3 ML/M^2
BH CV ECHO MEAS - SI(TEICH): 22.7 ML/M^2
BH CV ECHO MEAS - SV(CUBED): 54.9 ML
BH CV ECHO MEAS - SV(LVOT): 46.6 ML
BH CV ECHO MEAS - SV(MOD-SP2): 41 ML
BH CV ECHO MEAS - SV(MOD-SP4): 33 ML
BH CV ECHO MEAS - SV(TEICH): 43.3 ML
BH CV ECHO MEAS - TAPSE (>1.6): 0.9 CM2
BH CV ECHO MEAS - TR MAX PG: 19 MMHG
BH CV ECHO MEAS - TR MAX VEL: 217.4 CM/SEC
BH CV ECHO MEASUREMENTS AVERAGE E/E' RATIO: 9.81
BH CV VAS BP LEFT ARM: NORMAL MMHG
BH CV XLRA - RV BASE: 4.4 CM
BH CV XLRA - RV LENGTH: 5.6 CM
BH CV XLRA - RV MID: 3.2 CM
BH CV XLRA - TDI S': 8.01 CM/SEC
BUN BLD-MCNC: 27 MG/DL (ref 8–23)
BUN/CREAT SERPL: 23.9 (ref 7–25)
CALCIUM SPEC-SCNC: 8.4 MG/DL (ref 8.6–10.5)
CHLORIDE SERPL-SCNC: 97 MMOL/L (ref 98–107)
CO2 SERPL-SCNC: 27 MMOL/L (ref 22–29)
CREAT BLD-MCNC: 1.13 MG/DL (ref 0.57–1)
DEPRECATED RDW RBC AUTO: 49.5 FL (ref 37–54)
ERYTHROCYTE [DISTWIDTH] IN BLOOD BY AUTOMATED COUNT: 14 % (ref 12.3–15.4)
GFR SERPL CREATININE-BSD FRML MDRD: 46 ML/MIN/1.73
GLUCOSE BLD-MCNC: 95 MG/DL (ref 65–99)
HBA1C MFR BLD: 4.8 % (ref 4.8–5.6)
HCT VFR BLD AUTO: 33.6 % (ref 34–46.6)
HGB BLD-MCNC: 11.4 G/DL (ref 12–15.9)
L PNEUMO1 AG UR QL IA: NEGATIVE
LEFT ATRIUM VOLUME INDEX: 81.7 ML/M^2
LEFT ATRIUM VOLUME: 156 ML
LV EF 2D ECHO EST: 30 %
MAXIMAL PREDICTED HEART RATE: 135 BPM
MCH RBC QN AUTO: 32.9 PG (ref 26.6–33)
MCHC RBC AUTO-ENTMCNC: 33.9 G/DL (ref 31.5–35.7)
MCV RBC AUTO: 96.8 FL (ref 79–97)
PLATELET # BLD AUTO: 124 10*3/MM3 (ref 140–450)
PMV BLD AUTO: 10.8 FL (ref 6–12)
POTASSIUM BLD-SCNC: 3.1 MMOL/L (ref 3.5–5.2)
RBC # BLD AUTO: 3.47 10*6/MM3 (ref 3.77–5.28)
S PNEUM AG SPEC QL LA: NEGATIVE
SODIUM BLD-SCNC: 136 MMOL/L (ref 136–145)
STRESS TARGET HR: 115 BPM
TSH SERPL DL<=0.05 MIU/L-ACNC: 3.62 MIU/ML (ref 0.27–4.2)
WBC NRBC COR # BLD: 4.64 10*3/MM3 (ref 3.4–10.8)

## 2019-05-03 PROCEDURE — 83036 HEMOGLOBIN GLYCOSYLATED A1C: CPT | Performed by: HOSPITALIST

## 2019-05-03 PROCEDURE — 25010000002 HEPARIN (PORCINE) PER 1000 UNITS: Performed by: HOSPITALIST

## 2019-05-03 PROCEDURE — 85027 COMPLETE CBC AUTOMATED: CPT | Performed by: HOSPITALIST

## 2019-05-03 PROCEDURE — 25010000002 SULFUR HEXAFLUORIDE MICROSPH 60.7-25 MG RECONSTITUTED SUSPENSION: Performed by: NURSE PRACTITIONER

## 2019-05-03 PROCEDURE — 93308 TTE F-UP OR LMTD: CPT | Performed by: INTERNAL MEDICINE

## 2019-05-03 PROCEDURE — 94799 UNLISTED PULMONARY SVC/PX: CPT

## 2019-05-03 PROCEDURE — 93308 TTE F-UP OR LMTD: CPT

## 2019-05-03 PROCEDURE — 87899 AGENT NOS ASSAY W/OPTIC: CPT | Performed by: INTERNAL MEDICINE

## 2019-05-03 PROCEDURE — 80048 BASIC METABOLIC PNL TOTAL CA: CPT | Performed by: HOSPITALIST

## 2019-05-03 PROCEDURE — 93308 TTE F-UP OR LMTD: CPT | Performed by: NURSE PRACTITIONER

## 2019-05-03 PROCEDURE — 87070 CULTURE OTHR SPECIMN AEROBIC: CPT | Performed by: HOSPITALIST

## 2019-05-03 PROCEDURE — 71045 X-RAY EXAM CHEST 1 VIEW: CPT

## 2019-05-03 PROCEDURE — 87205 SMEAR GRAM STAIN: CPT | Performed by: HOSPITALIST

## 2019-05-03 PROCEDURE — 99233 SBSQ HOSP IP/OBS HIGH 50: CPT | Performed by: INTERNAL MEDICINE

## 2019-05-03 PROCEDURE — 87040 BLOOD CULTURE FOR BACTERIA: CPT | Performed by: INTERNAL MEDICINE

## 2019-05-03 PROCEDURE — 93005 ELECTROCARDIOGRAM TRACING: CPT | Performed by: NURSE PRACTITIONER

## 2019-05-03 PROCEDURE — 93010 ELECTROCARDIOGRAM REPORT: CPT | Performed by: INTERNAL MEDICINE

## 2019-05-03 PROCEDURE — 25010000002 CEFTRIAXONE PER 250 MG: Performed by: HOSPITALIST

## 2019-05-03 PROCEDURE — 99222 1ST HOSP IP/OBS MODERATE 55: CPT | Performed by: INTERNAL MEDICINE

## 2019-05-03 PROCEDURE — 25010000002 VANCOMYCIN 10 G RECONSTITUTED SOLUTION: Performed by: INTERNAL MEDICINE

## 2019-05-03 PROCEDURE — 84443 ASSAY THYROID STIM HORMONE: CPT | Performed by: HOSPITALIST

## 2019-05-03 RX ORDER — GABAPENTIN 100 MG/1
200 CAPSULE ORAL NIGHTLY
Status: DISCONTINUED | OUTPATIENT
Start: 2019-05-03 | End: 2019-05-05 | Stop reason: HOSPADM

## 2019-05-03 RX ORDER — BUMETANIDE 0.25 MG/ML
1 INJECTION INTRAMUSCULAR; INTRAVENOUS DAILY
Status: DISCONTINUED | OUTPATIENT
Start: 2019-05-04 | End: 2019-05-05 | Stop reason: HOSPADM

## 2019-05-03 RX ORDER — CARVEDILOL 3.12 MG/1
3.12 TABLET ORAL 2 TIMES DAILY WITH MEALS
Status: DISCONTINUED | OUTPATIENT
Start: 2019-05-03 | End: 2019-05-04

## 2019-05-03 RX ADMIN — LEVOTHYROXINE SODIUM 50 MCG: 50 TABLET ORAL at 06:47

## 2019-05-03 RX ADMIN — IPRATROPIUM BROMIDE AND ALBUTEROL SULFATE 3 ML: 2.5; .5 SOLUTION RESPIRATORY (INHALATION) at 08:49

## 2019-05-03 RX ADMIN — IPRATROPIUM BROMIDE AND ALBUTEROL SULFATE 3 ML: 2.5; .5 SOLUTION RESPIRATORY (INHALATION) at 18:43

## 2019-05-03 RX ADMIN — POTASSIUM CHLORIDE 40 MEQ: 750 CAPSULE, EXTENDED RELEASE ORAL at 13:15

## 2019-05-03 RX ADMIN — HEPARIN SODIUM 5000 UNITS: 5000 INJECTION INTRAVENOUS; SUBCUTANEOUS at 22:03

## 2019-05-03 RX ADMIN — ASPIRIN 81 MG: 81 TABLET, COATED ORAL at 09:22

## 2019-05-03 RX ADMIN — GUAIFENESIN 600 MG: 600 TABLET, EXTENDED RELEASE ORAL at 09:22

## 2019-05-03 RX ADMIN — GABAPENTIN 200 MG: 100 CAPSULE ORAL at 22:03

## 2019-05-03 RX ADMIN — Medication 250 MG: at 09:23

## 2019-05-03 RX ADMIN — IPRATROPIUM BROMIDE AND ALBUTEROL SULFATE 3 ML: 2.5; .5 SOLUTION RESPIRATORY (INHALATION) at 13:12

## 2019-05-03 RX ADMIN — DOXYCYCLINE 100 MG: 100 CAPSULE ORAL at 22:02

## 2019-05-03 RX ADMIN — Medication 250 MG: at 22:02

## 2019-05-03 RX ADMIN — VANCOMYCIN HYDROCHLORIDE 2000 MG: 10 INJECTION, POWDER, LYOPHILIZED, FOR SOLUTION INTRAVENOUS at 22:02

## 2019-05-03 RX ADMIN — CEFTRIAXONE SODIUM 1 G: 1 INJECTION, SOLUTION INTRAVENOUS at 13:16

## 2019-05-03 RX ADMIN — SERTRALINE HYDROCHLORIDE 100 MG: 100 TABLET ORAL at 09:23

## 2019-05-03 RX ADMIN — GUAIFENESIN 600 MG: 600 TABLET, EXTENDED RELEASE ORAL at 22:03

## 2019-05-03 RX ADMIN — SULFUR HEXAFLUORIDE 4 ML: KIT at 14:30

## 2019-05-03 RX ADMIN — HEPARIN SODIUM 5000 UNITS: 5000 INJECTION INTRAVENOUS; SUBCUTANEOUS at 13:15

## 2019-05-03 RX ADMIN — PANTOPRAZOLE SODIUM 40 MG: 40 TABLET, DELAYED RELEASE ORAL at 09:22

## 2019-05-03 RX ADMIN — HEPARIN SODIUM 5000 UNITS: 5000 INJECTION INTRAVENOUS; SUBCUTANEOUS at 06:47

## 2019-05-03 RX ADMIN — DOXYCYCLINE 100 MG: 100 CAPSULE ORAL at 09:22

## 2019-05-03 RX ADMIN — SODIUM CHLORIDE, PRESERVATIVE FREE 3 ML: 5 INJECTION INTRAVENOUS at 22:03

## 2019-05-03 RX ADMIN — BUMETANIDE 0.5 MG: 0.5 TABLET ORAL at 09:22

## 2019-05-03 RX ADMIN — SODIUM CHLORIDE, PRESERVATIVE FREE 3 ML: 5 INJECTION INTRAVENOUS at 09:23

## 2019-05-03 RX ADMIN — HYDROCHLOROTHIAZIDE 12.5 MG: 12.5 TABLET ORAL at 09:22

## 2019-05-03 RX ADMIN — POTASSIUM CHLORIDE 40 MEQ: 1.5 POWDER, FOR SOLUTION ORAL at 09:30

## 2019-05-03 RX ADMIN — CARVEDILOL 3.12 MG: 3.12 TABLET, FILM COATED ORAL at 18:52

## 2019-05-03 RX ADMIN — IPRATROPIUM BROMIDE AND ALBUTEROL SULFATE 3 ML: 2.5; .5 SOLUTION RESPIRATORY (INHALATION) at 16:22

## 2019-05-03 NOTE — CONSULTS
[facility]   Consult Note    Referring Provider: Dr. Steiner    Primary Cardiologist: Dr. Welch in [place], [place]    Reason for Consultation: CHF and Cardiomyopathy    Patient Care Team:  Provider, No Known as PCP - General     Problem List  1. Congestive heart failure  2. Atrial fibrillation  1. Watchman device  3. Hypothyroidism  4. Ulcerative colitis  5. CKD  6. GERD  7. Surgeries:  1. Right ankle surgery  2. Bilateral breast biopsy      No Known Allergies        Current Facility-Administered Medications:   •  aspirin EC tablet 81 mg, 81 mg, Oral, Daily, Byron Botello MD, 81 mg at 05/03/19 0922  •  bumetanide (BUMEX) tablet 0.5 mg, 0.5 mg, Oral, Daily, Byron Botello MD, 0.5 mg at 05/03/19 0922  •  carvedilol (COREG) tablet 6.25 mg, 6.25 mg, Oral, BID With Meals, Byron Botello MD, 6.25 mg at 05/02/19 2129  •  cefTRIAXone (ROCEPHIN) IVPB 1 g, 1 g, Intravenous, Q24H, Byron Botello MD  •  doxycycline (MONODOX) capsule 100 mg, 100 mg, Oral, Q12H, Byron Botello MD, 100 mg at 05/03/19 0922  •  gabapentin (NEURONTIN) capsule 200 mg, 200 mg, Oral, Nightly, Lucia Steiner MD  •  guaiFENesin (MUCINEX) 12 hr tablet 600 mg, 600 mg, Oral, Q12H, Byron Botello MD, 600 mg at 05/03/19 0922  •  heparin (porcine) 5000 UNIT/ML injection 5,000 Units, 5,000 Units, Subcutaneous, Q8H, Byron Botello MD, 5,000 Units at 05/03/19 0647  •  hydrochlorothiazide (HYDRODIURIL) tablet 12.5 mg, 12.5 mg, Oral, Daily, Byron Botello MD, 12.5 mg at 05/03/19 0922  •  ipratropium-albuterol (DUO-NEB) nebulizer solution 3 mL, 3 mL, Nebulization, 4x Daily - RT, Byron Botello MD, 3 mL at 05/03/19 0849  •  ipratropium-albuterol (DUO-NEB) nebulizer solution 3 mL, 3 mL, Nebulization, Q4H PRN, Byron Botello MD  •  levothyroxine (SYNTHROID, LEVOTHROID) tablet 50 mcg, 50 mcg, Oral, Q AM, Byron Botello MD, 50 mcg at 05/03/19 0647  •  Magnesium Sulfate 2 gram Bolus, followed by 8 gram  infusion (total Mg dose 10 grams)- Mg less than or equal to 1mg/dL, 2 g, Intravenous, PRN **OR** Magnesium Sulfate 2 gram / 50mL Infusion (GIVE X 3 BAGS TO EQUAL 6GM TOTAL DOSE) - Mg 1.1 - 1.5 mg/dl, 2 g, Intravenous, PRN **OR** Magnesium Sulfate 4 gram infusion- Mg 1.6-1.9 mg/dL, 4 g, Intravenous, PRN, Byron Botello MD  •  pantoprazole (PROTONIX) EC tablet 40 mg, 40 mg, Oral, Daily, Byron Botello MD, 40 mg at 05/03/19 0922  •  potassium chloride (MICRO-K) CR capsule 40 mEq, 40 mEq, Oral, PRN, 40 mEq at 05/02/19 2126 **OR** potassium chloride (KLOR-CON) packet 40 mEq, 40 mEq, Oral, PRN, 40 mEq at 05/03/19 0930 **OR** potassium chloride 10 mEq in 100 mL IVPB, 10 mEq, Intravenous, Q1H PRN, Byron Botello MD  •  saccharomyces boulardii (FLORASTOR) capsule 250 mg, 250 mg, Oral, BID, Byron Botello MD, 250 mg at 05/03/19 0923  •  sertraline (ZOLOFT) tablet 100 mg, 100 mg, Oral, Daily, Byron Botello MD, 100 mg at 05/03/19 0923  •  sodium chloride 0.9 % flush 10 mL, 10 mL, Intravenous, PRN, Mike Verma MD  •  sodium chloride 0.9 % flush 3 mL, 3 mL, Intravenous, Q12H, Byron Botello MD, 3 mL at 05/03/19 0923  •  sodium chloride 0.9 % flush 3-10 mL, 3-10 mL, Intravenous, PRN, Byron Botello MD         Medications Prior to Admission   Medication Sig Dispense Refill Last Dose   • aspirin 81 MG EC tablet Take 81 mg by mouth Daily.   5/1/2019 at Unknown time   • bumetanide (BUMEX) 1 MG tablet Take 0.5 mg by mouth Daily.   5/1/2019 at Unknown time   • carvedilol (COREG) 6.25 MG tablet Take 6.25 mg by mouth 2 (Two) Times a Day With Meals.   5/1/2019 at Unknown time   • Cholecalciferol (VITAMIN D3 PO) Take 50 mcg by mouth Daily.   5/1/2019 at Unknown time   • Ferrous Sulfate (SLOW FE PO) Take 40 mg by mouth Daily.   5/1/2019 at Unknown time   • gabapentin (NEURONTIN) 100 MG capsule Take 200 mg by mouth Every Night.   5/1/2019 at Unknown time   • hydrochlorothiazide (HYDRODIURIL) 12.5 MG tablet Take 12.5  mg by mouth Daily.   5/1/2019 at Unknown time   • levothyroxine (SYNTHROID, LEVOTHROID) 50 MCG tablet Take 50 mcg by mouth Daily.   5/1/2019 at Unknown time   • Magnesium 500 MG capsule Take 500 mg by mouth Daily.   5/1/2019 at Unknown time   • Omega-3 Fatty Acids (FISH OIL) 1200 MG capsule delayed-release Take 1 capsule by mouth Daily.   5/1/2019 at Unknown time   • pantoprazole (PROTONIX) 40 MG EC tablet Take 40 mg by mouth Daily.   5/1/2019 at Unknown time   • sertraline (ZOLOFT) 100 MG tablet Take 100 mg by mouth Daily.   5/1/2019 at Unknown time   • vitamin B-12 (CYANOCOBALAMIN) 1000 MCG tablet Take 1,000 mcg by mouth Daily.   5/1/2019 at Unknown time         Subjective .   History of present illness:    Patient is a 85-year-old  female who we are asked to see today for further evaluation of suspected heart failure and decreased LVEF by echocardiogram.  She has no previous history of cardiomyopathy per her report.  She does report previous history of congestive heart failure.  Has had no previous history of coronary disease.  She has history of atrial fibrillation for which she was unable to tolerate anticoagulation secondary to recurrent GI bleeding.  She underwent subsequent watchman device placement.  She has been in [place] since Good Friday visiting her son.  Patient's son notes that whenever she arrived she was fatigued.  Began to complain of some upper and lower respiratory symptoms including sinus congestion as well as drainage.  This got progressively worse and she was taken to a [facility].  There she was checked for strep which was negative.  The night before they went into the [facility] she had significant diaphoresis and changed her clothes.  It was determined that this was likely either allergies or something viral and she was not given an antibiotic.  Still continued to have worsening congestion, cough with clear sputum.  No objective fever.  Due to progressive worsening of symptoms presented to the  [facility] for further evaluation.  Thus far her evaluation has noted that she is positive for the parainfluenza virus.  She has been placed on droplet protocol.  She denies any recent edema.  Does note over the last few months shortness of breath with physical activity as well as some mild orthopnea.  Notes that she has been under a significant amount of stress since her  passed away August of last year.  She fell earlier this year and underwent surgery on her right ankle in January.  Patient thinks that she had an echocardiogram prior to this.  No reported syncope.  However, does report stage III chronic kidney disease as well as hypotension.  Patient's son reports very poor intake by his mother over the course of the last few days.      Social History     Socioeconomic History   • Marital status:      Spouse name: Not on file   • Number of children: Not on file   • Years of education: Not on file   • Highest education level: Not on file   Tobacco Use   • Smoking status: Former Smoker   • Smokeless tobacco: Never Used   • Tobacco comment: QUIT 30 YEARS AGO    Substance and Sexual Activity   • Alcohol use: Yes     Alcohol/week: 4.2 oz     Types: 7 Glasses of wine per week     Frequency: Never   • Sexual activity: No     History reviewed. No pertinent family history.      Review of Systems:  Review of Systems   Constitution: Positive for weakness and malaise/fatigue. Negative for fever.   HENT: Negative for nosebleeds.    Eyes: Negative for redness and visual disturbance.   Cardiovascular: Negative for orthopnea, palpitations and paroxysmal nocturnal dyspnea.   Respiratory: Positive for cough, shortness of breath, sputum production and wheezing. Negative for snoring.    Hematologic/Lymphatic: Negative for bleeding problem.   Skin: Negative for flushing, itching and rash.   Musculoskeletal: Positive for arthritis and joint pain. Negative for falls and muscle cramps.   Gastrointestinal: Negative for  "abdominal pain, diarrhea, heartburn, nausea and vomiting.   Genitourinary: Negative for hematuria.   Neurological: Negative for excessive daytime sleepiness, dizziness, headaches and tremors.   Psychiatric/Behavioral: Negative for substance abuse. The patient is not nervous/anxious.         Objective   Vitals:  Blood pressure (!) 85/69, pulse 100, temperature 97.8 °F (36.6 °C), temperature source Oral, resp. rate 18, height 170.2 cm (67\"), weight 78.1 kg (172 lb 1.6 oz), SpO2 91 %.     Intake/Output Summary (Last 24 hours) at 5/3/2019 1219  Last data filed at 5/3/2019 0500  Gross per 24 hour   Intake 300 ml   Output 1800 ml   Net -1500 ml       Physical Exam   Constitutional: She is oriented to person, place, and time. She appears well-developed and well-nourished. No distress.   HENT:   Head: Normocephalic and atraumatic.   Eyes: Right eye exhibits no discharge. Left eye exhibits no discharge.   Neck: No JVD present. No tracheal deviation present.   Cardiovascular: Normal rate, regular rhythm, normal heart sounds and intact distal pulses. Exam reveals no friction rub.   No murmur heard.  Pulmonary/Chest: Effort normal. No respiratory distress. She has rhonchi in the right middle field, the right lower field, the left middle field and the left lower field.   Bilateral crackles   Abdominal: Soft. Bowel sounds are normal. There is no tenderness.   Musculoskeletal: She exhibits no edema or deformity.   Neurological: She is alert and oriented to person, place, and time.   Skin: Skin is warm and dry.            Results Review:  I reviewed the patient's new clinical results.  Results from last 7 days   Lab Units 05/03/19  0235   WBC 10*3/mm3 4.64   HEMOGLOBIN g/dL 11.4*   HEMATOCRIT % 33.6*   PLATELETS 10*3/mm3 124*     Results from last 7 days   Lab Units 05/03/19  0235 05/02/19  1119   SODIUM mmol/L 136 134*   POTASSIUM mmol/L 3.1* 3.2*   CHLORIDE mmol/L 97* 96*   CO2 mmol/L 27.0 22.0   BUN mg/dL 27* 32*   CREATININE " mg/dL 1.13* 1.12*   CALCIUM mg/dL 8.4* 9.0   BILIRUBIN mg/dL  --  1.9*   ALK PHOS U/L  --  63   ALT (SGPT) U/L  --  19   AST (SGOT) U/L  --  31   GLUCOSE mg/dL 95 123*     Results from last 7 days   Lab Units 05/03/19  0235   SODIUM mmol/L 136   POTASSIUM mmol/L 3.1*   CHLORIDE mmol/L 97*   CO2 mmol/L 27.0   BUN mg/dL 27*   CREATININE mg/dL 1.13*   GLUCOSE mg/dL 95   CALCIUM mg/dL 8.4*         No results found for: TROPONINI  Results from last 7 days   Lab Units 05/03/19  0235   TSH mIU/mL 3.620         Results from last 7 days   Lab Units 05/02/19  1119   PROBNP pg/mL 18,779.0*           Tele:  afib with frequent PVC's    EKG: [place]      Assessment/Plan     1. Cardiomyopathy, noted on echocardiogram this admit.  LVEF 30%.  Unclear previous LVEF by her outside cardiologist but per patient this may be new.  She notes several months of progressive dyspnea on exertion prior to this admission.  No known CAD.  2. Atrial fibrillation, chronic with previous Watchman device, intolerant to anticoagulation due to recurrent GI bleeds  3. Hypotension, some due to recent poor PO intake and medications.  Chronic  4. Parainfluenza virus, symptoms most consistent with this.  This is the cause of her acute respiratory illness.      Plan:    1. At this time, she is hypoxic, due to viral pneumonia, and has a cardiomyopathy.  Clinically her acute illness appears to be primarily infectious/inflammatory.  She certainly has a component of cardiomyopathy and in some mild heart failure for which we can gently diurese as tolerated by her renal function.  At this time, her blood pressure is too low to add ACE/ARB/beta-blocker and we will defer these for now.  It is quite possible as I discussed with the patient that her decreased LVEF is new and due to her recent acute viral illness, it is also quite possible that she has had this for quite some time given her recent decline in functional capacity over the last several months.   "  2. Attempt to get records from primary cardiologist.  3. Stop HCTZ at this time due to hypotension.  4. Long discussion with the family today, our plan will be to certainly stabilize her from a volume respiratory illness.  She is not a candidate for defibrillator at this time as she has been discussing.  We will have to follow her A. fib rates to ensure there adequate when she is no longer \"sick\".  We will likely simply \"tune her up, and have her be reevaluated with repeat echocardiogram as an outpatient by her primary cardiologist in [place].        DICK Fry obtained past medical, family history, social history, review of systems and functioned as a scribe for the remainder of the dictation for Dr. Wetzel.      Electronically signed by DICK Fry, 05/03/19, 12:19 PM.    I, Marbin Wetzel MD, personally performed the services described in this documentation as scribed by the above individual in my presence, and it is both accurate and complete    Dictated utilizing Dragon dictation    "

## 2019-05-03 NOTE — PROGRESS NOTES
Discharge Planning Assessment  [facility]     Patient Name: Liz Lester  MRN: [id number]  Today's Date: 5/3/2019    Admit Date: 5/2/2019    Discharge Needs Assessment     Row Name 05/03/19 1122       Living Environment    Lives With  alone    Current Living Arrangements  home/apartment/condo    Primary Care Provided by  self    Provides Primary Care For  no one    Family Caregiver if Needed  none    Quality of Family Relationships  involved;supportive    Able to Return to Prior Arrangements  yes       Resource/Environmental Concerns    Resource/Environmental Concerns  none       Transition Planning    Patient/Family Anticipated Services at Transition      Transportation Anticipated  family or friend will provide       Discharge Needs Assessment    Readmission Within the Last 30 Days  no previous admission in last 30 days    Concerns to be Addressed  discharge planning    Equipment Currently Used at Home  commode;shower chair;walker, rolling    Anticipated Changes Related to Illness  none    Equipment Needed After Discharge  none        Discharge Plan     Row Name 05/03/19 1123       Plan    Plan  Home    Patient/Family in Agreement with Plan  yes    Plan Comments  Spoke with patient in room to initiate discharge planning.  She lives alone in [place] and is here visiting her son.  She is independent with ADL's.  She has had home health in the past, but can't remember the name of the provider.  She has a shower chair, bedside commode and rolling walker at home.  Ms. Lester has RX coverage and has her scripts filled at [facility].  Her plan is to return home at dsicharge.  She denies any needs at this time.  CM will continue to follow.  Linnette Garcia RN [phone]    Final Discharge Disposition Code  01 - home or self-care        Destination      No service coordination in this encounter.      Durable Medical Equipment      No service coordination in this encounter.      Dialysis/Infusion      No service  coordination in this encounter.      Home Medical Care      No service coordination in this encounter.      Therapy      No service coordination in this encounter.      Community Resources      No service coordination in this encounter.        Expected Discharge Date and Time     Expected Discharge Date Expected Discharge Time    May 6, 2019         Demographic Summary     Row Name 05/03/19 1120       General Information    Admission Type  inpatient    Arrived From  emergency department    Referral Source  admission list    Reason for Consult  discharge planning    Preferred Language  English     Used During This Interaction  no    General Information Comments  PCP- Melida Capone        Functional Status     Row Name 05/03/19 1122       Functional Status    Usual Activity Tolerance  good    Current Activity Tolerance  good       Functional Status, IADL    Medications  independent    Meal Preparation  independent    Housekeeping  independent    Laundry  independent    Shopping  independent        Psychosocial    No documentation.       Abuse/Neglect    No documentation.       Legal     Row Name 05/03/19 1122       Financial/Legal    Finance Comments  Verified with patient that she has [employer] MCR.  No issues obtaining medications.        Substance Abuse    No documentation.       Patient Forms    No documentation.           Linnette Garcia RN

## 2019-05-03 NOTE — PLAN OF CARE
Problem: Patient Care Overview  Goal: Plan of Care Review  Outcome: Ongoing (interventions implemented as appropriate)   05/03/19 0900   Coping/Psychosocial   Plan of Care Reviewed With patient;other (see comments)  (Cathy MATSON)   Plan of Care Review   Progress no change   OTHER   Outcome Summary WOC nurse consulted to assess buttocks/groin and labial skin. Pt has mild MASD in perirectal region, bilateral groins and labia; cleaned with blue skin wipes; Z-guard applied; HUSSEIN with dry james pads. May use InterDry in groin folds to wick moisture. Heel floated on pillow. Waffle mattress on bed. See LDA's and wound/skin orders. WOC nurse will s/o at this time. Please contact WOC nurse as needed for concerns.

## 2019-05-03 NOTE — PROGRESS NOTES
[facility] Medicine Services  PROGRESS NOTE    Patient Name: Liz Lester  : 1934  MRN: [id number]    Date of Admission: 2019  Length of Stay: 1  Primary Care Physician: Provider, No Known    Subjective   Subjective     CC:  SOA    HPI:  Pt feeling a bit better this am, denies any fever or chills overnight.     Review of Systems  Gen- No fevers, chills  CV- No chest pain, palpitations  Resp- + cough, + dyspnea  GI- No N/V/D, abd pain    Otherwise ROS is negative except as mentioned in the HPI.    Objective   Objective     Vital Signs:   Temp:  [96.5 °F (35.8 °C)-98.8 °F (37.1 °C)] 96.5 °F (35.8 °C)  Heart Rate:  [] 87  Resp:  [16-20] 18  BP: ()/(62-95) 96/64        Physical Exam:  Constitutional: No acute distress, awake, alert  HENT: NCAT, mucous membranes moist  Respiratory: crackles at bases bilaterally  Cardiovascular: RRR, no murmurs, rubs, or gallops, palpable pedal pulses bilaterally  Gastrointestinal: Positive bowel sounds, soft, nontender, nondistended  Musculoskeletal: No bilateral ankle edema  Psychiatric: Appropriate affect, cooperative  Neurologic: Oriented x 3, strength symmetric in all extremities, Cranial Nerves grossly intact to confrontation, speech clear  Skin: No rashes  Results Reviewed:  I have personally reviewed current lab, radiology, and data and agree.    Results from last 7 days   Lab Units 19  0235 19  1119   WBC 10*3/mm3 4.64 6.64   HEMOGLOBIN g/dL 11.4* 13.1   HEMATOCRIT % 33.6* 39.8   PLATELETS 10*3/mm3 124* 133*     Results from last 7 days   Lab Units 19  0235 19  1119   SODIUM mmol/L 136 134*   POTASSIUM mmol/L 3.1* 3.2*   CHLORIDE mmol/L 97* 96*   CO2 mmol/L 27.0 22.0   BUN mg/dL 27* 32*   CREATININE mg/dL 1.13* 1.12*   GLUCOSE mg/dL 95 123*   CALCIUM mg/dL 8.4* 9.0   ALT (SGPT) U/L  --  19   AST (SGOT) U/L  --  31     Estimated Creatinine Clearance: 39.2 mL/min (A) (by C-G formula based on SCr of 1.13  mg/dL (H)).    No results found for: BNP    Microbiology Results Abnormal     Procedure Component Value - Date/Time    Respiratory Panel, PCR - Swab, Nasopharynx [[id number]]  (Abnormal) Collected:  05/02/19 1901    Lab Status:  Final result Specimen:  Swab from Nasopharynx Updated:  05/02/19 2007     ADENOVIRUS, PCR Not Detected     Coronavirus 229E Not Detected     Coronavirus HKU1 Not Detected     Coronavirus NL63 Not Detected     Coronavirus OC43 Not Detected     Human Metapneumovirus Not Detected     Human Rhinovirus/Enterovirus Not Detected     Influenza B PCR Not Detected     Parainfluenza Virus 1 Not Detected     Parainfluenza Virus 2 Not Detected     Parainfluenza Virus 3 Detected     Parainfluenza Virus 4 Not Detected     Bordetella pertussis pcr Not Detected     Influenza A H1 2009 PCR Not Detected     Chlamydophila pneumoniae PCR Not Detected     Mycoplasma pneumo by PCR Not Detected     Influenza A PCR Not Detected     Influenza A H3 Not Detected     Influenza A H1 Not Detected     RSV, PCR Not Detected     Bordetella parapertussis PCR Not Detected          Imaging Results (last 24 hours)     Procedure Component Value Units Date/Time    XR Chest 1 View [[id number]] Updated:  05/03/19 0401    XR Chest 1 View [[id number]] Collected:  05/02/19 1307     Updated:  05/02/19 2203    Narrative:       EXAMINATION: XR CHEST 1 VW-      INDICATION: Cough, shortness of air.      COMPARISON: None.     FINDINGS: Heart is enlarged. Vasculature is cephalized. Mild diffuse  interstitial changes are nonspecific but could represent early  interstitial edema. There may be minimal pleural effusion. No lung  consolidation or pneumothorax is seen.           Impression:       1. Cardiomegaly and mild pulmonary vascular congestion.  2. Mild diffuse interstitial disease, nonspecific. Very early  interstitial edema might have this appearance. Bronchitis could appear  similar. No focal pneumonia is identified.     D:  05/02/2019  E:   05/02/2019     This report was finalized on 5/2/2019 10:00 PM by DR. Byron Monaco MD.       CT Chest Without Contrast [[id number]] Collected:  05/02/19 1655     Updated:  05/02/19 1655    Narrative:       EXAMINATION: CT CHEST WO CONTRAST-05/02/2019:      INDICATION: Dyspnea; I50.9-Heart failure, unspecified; R09.02-Hypoxemia.     TECHNIQUE: 5 mm unenhanced images through the chest and upper abdomen.     The radiation dose reduction device was turned on for each scan per the  ALARA (As Low as Reasonably Achievable) protocol.     COMPARISON: NONE.     FINDINGS: History indicates dyspnea. Mediastinal window images show  shotty mediastinal lymph nodes but no significant adenopathy. The heart  is enlarged. Trace pericardial effusion. There are minimal pleural  effusions. Lung window images show patchy bibasilar interstitial  disease, peribronchial thickening, and minimal effusions. Appearance of  the lower lungs could reflect bronchitis or aspiration. No similar  changes are seen elsewhere. A view subpleural micronodules are present  in the lung apices, presumably old granulomatous disease. No significant  nodularity is seen elsewhere.     The included images of the upper abdomen show a water-density round 2.2  cm left lobe liver cyst, and a couple of other subcentimeter cysts. No  significant abnormalities are seen in the included portions of the  spleen, adrenal glands, or upper renal poles.       Impression:       1.  Moderate patchy bibasilar disease, small effusions and diffuse  peribronchial thickening. Consider pneumonia and aspiration. The patient  might have these findings with waxing and waning CHF as well.  2.  Cardiomegaly, trace pericardial effusion and shotty mediastinal  lymph nodes, nonspecific.  3.  Incidentally noted small hepatic cysts.     D:  05/02/2019  E:  05/02/2019                   Results for orders placed during the hospital encounter of 05/02/19   Adult Transthoracic Echo Complete W/ Cont if  Necessary Per Protocol    Narrative · Moderate mitral valve regurgitation is present  · Mild tricuspid valve regurgitation is present.  · There is a small (<1cm) pericardial effusion.  · Estimated EF = 30%.          I have reviewed the medications:    Current Facility-Administered Medications:   •  aspirin EC tablet 81 mg, 81 mg, Oral, Daily, Byron Botello MD, 81 mg at 05/02/19 2126  •  bumetanide (BUMEX) tablet 0.5 mg, 0.5 mg, Oral, Daily, Byron Botello MD, 0.5 mg at 05/02/19 2125  •  carvedilol (COREG) tablet 6.25 mg, 6.25 mg, Oral, BID With Meals, Byron Botello MD, 6.25 mg at 05/02/19 2129  •  cefTRIAXone (ROCEPHIN) IVPB 1 g, 1 g, Intravenous, Q24H, Byron Botello MD  •  doxycycline (MONODOX) capsule 100 mg, 100 mg, Oral, Q12H, Byron Botello MD, 100 mg at 05/02/19 2128  •  guaiFENesin (MUCINEX) 12 hr tablet 600 mg, 600 mg, Oral, Q12H, Byron Botello MD, 600 mg at 05/02/19 2128  •  heparin (porcine) 5000 UNIT/ML injection 5,000 Units, 5,000 Units, Subcutaneous, Q8H, Byron Botello MD, 5,000 Units at 05/03/19 0647  •  hydrochlorothiazide (HYDRODIURIL) tablet 12.5 mg, 12.5 mg, Oral, Daily, Byron Botello MD, 12.5 mg at 05/02/19 2127  •  ipratropium-albuterol (DUO-NEB) nebulizer solution 3 mL, 3 mL, Nebulization, 4x Daily - RT, Byron Botello MD, 3 mL at 05/02/19 2045  •  ipratropium-albuterol (DUO-NEB) nebulizer solution 3 mL, 3 mL, Nebulization, Q4H PRN, Byron Botello MD  •  levothyroxine (SYNTHROID, LEVOTHROID) tablet 50 mcg, 50 mcg, Oral, Q AM, Byron Botello MD, 50 mcg at 05/03/19 0647  •  Magnesium Sulfate 2 gram Bolus, followed by 8 gram infusion (total Mg dose 10 grams)- Mg less than or equal to 1mg/dL, 2 g, Intravenous, PRN **OR** Magnesium Sulfate 2 gram / 50mL Infusion (GIVE X 3 BAGS TO EQUAL 6GM TOTAL DOSE) - Mg 1.1 - 1.5 mg/dl, 2 g, Intravenous, PRN **OR** Magnesium Sulfate 4 gram infusion- Mg 1.6-1.9 mg/dL, 4 g, Intravenous, PRN, Byron Botello MD  •  pantoprazole (PROTONIX)  EC tablet 40 mg, 40 mg, Oral, Daily, Byron Botello MD  •  potassium chloride (MICRO-K) CR capsule 40 mEq, 40 mEq, Oral, PRN, 40 mEq at 05/02/19 2126 **OR** potassium chloride (KLOR-CON) packet 40 mEq, 40 mEq, Oral, PRN **OR** potassium chloride 10 mEq in 100 mL IVPB, 10 mEq, Intravenous, Q1H PRN, Byron Botello MD  •  saccharomyces boulardii (FLORASTOR) capsule 250 mg, 250 mg, Oral, BID, Byron Botello MD, 250 mg at 05/02/19 2125  •  sertraline (ZOLOFT) tablet 100 mg, 100 mg, Oral, Daily, Byron Botello MD  •  sodium chloride 0.9 % flush 10 mL, 10 mL, Intravenous, PRN, Mike Verma MD  •  sodium chloride 0.9 % flush 3 mL, 3 mL, Intravenous, Q12H, Byron Botello MD, 3 mL at 05/02/19 2128  •  sodium chloride 0.9 % flush 3-10 mL, 3-10 mL, Intravenous, PRN, Byron Botello MD      Assessment/Plan   Assessment / Plan     Active Hospital Problems    Diagnosis POA   • **Parainfluenza virus pneumonia [J12.2] Unknown     Priority: High   • Dyspnea [R06.00] Yes     Priority: High   • CHF (congestive heart failure) (CMS/HCC) [I50.9] Unknown     Priority: Medium   • Atrial fibrillation (CMS/HCC) [I48.91] Unknown     Priority: Medium   • Acute on chronic congestive heart failure (CMS/HCC) [I50.9] Yes     Priority: Medium   • Hypothyroidism (acquired) [E03.9] Unknown     Priority: Low   • Depression [F32.9] Unknown     Priority: Low          Brief Hospital Course to date:  Liz Lester is a 85 y.o. female with PMH of Afib s/p Watchman Device, Hypothyroidism, depression, and CHF who presented to the ED with complaints of dyspnea and cough productive of yellow sputum.  Pt's proBNP significantly elevated on admission, so she was treated for volume overload/CHF.  She was ultimately found to have parainfluenza virus on respiratory PCR.    Plan:    Acute viral PNA  --+ parainfluenza 3 on respiratory PCR  --will continue to treat for CAP bacterial PNA as well given high occurrence of concomitant bacterial  infection with parainfluenza especially in elderly  --send urine strep and Legionella Ags    Acute on chronic systolic HF  H/o Afib s/p Watchman device  --TTE shows EF 30%, [place] Also unclear of etiology of CHF.    --will ask Cards to see as she at least qualifies for LifeVest prior to d/c.       Lactic acidosis  --resolved    Hypokalemia  --replace per protocol    Thrombocytopenia  --mild,monitor    Normocytic Anemia  --send workup    Hypothyroidism  --continue Synthroid, TSH wnl    Depression  --continue home meds    DVT Prophylaxis:  BOOKER    Disposition: I expect the patient to be discharged TBD    CODE STATUS:   Code Status and Medical Interventions:   Ordered at: 05/02/19 1413     Level Of Support Discussed With:    Patient     Code Status:    CPR     Medical Interventions (Level of Support Prior to Arrest):    Full         Electronically signed by Lucia Steiner MD, 05/03/19, 8:28 AM.

## 2019-05-03 NOTE — PAYOR COMM NOTE
"Liz Perales (85 y.o. Female)     Date of Birth Social Security Number Address Home Phone MRN    1934  [address] [address] [phone] [id number]    Jew Marital Status          Mormon        Admission Date Admission Type Admitting Provider Attending Provider Department, Room/Bed    19 Emergency Lucia Steiner MD Howard, Gabriela Kirk, MD [facility] 3F, S312/1    Discharge Date Discharge Disposition Discharge Destination                       Attending Provider:  Lucia Steiner MD    Allergies:  No Known Allergies    Isolation:  Droplet   Infection:  None   Code Status:  CPR    Ht:  170.2 cm (67\")   Wt:  78.1 kg (172 lb 1.6 oz)    Admission Cmt:  None   Principal Problem:  Parainfluenza virus pneumonia [J12.2]                 Active Insurance as of 2019     Primary Coverage     Payor Plan Insurance Group Employer/Plan Group    MISC MEDICARE REPLACMENT MIS MCARE REPLACEMENT [id number]     Coverage Address Coverage Phone Number Coverage Fax Number Effective Dates    [address] [phone]  2019 - None Entered    [place] [address]       Subscriber Name Subscriber Birth Date Member ID       LIZ PERALES 1934 [id number]                 Emergency Contacts      (Rel.) Home Phone Work Phone Mobile Phone    Kody Perales (Son) [phone] -- [phone]               History & Physical      Byron Botello MD at 2019  2:32 PM              [facility] Medicine Services  HISTORY AND PHYSICAL    Patient Name: Liz Perales  : 1934  MRN: [id number]  Primary Care Physician: Provider, No Known  Date of admission: 2019      Subjective   Subjective     Chief Complaint:  Dyspnea    HPI:  Liz Perales is a 85 y.o. female with history of CHF and atrial fibrillation s/p Watchman's  here with progressive dyspnea for 2-3 days with cough/congestin/weakness. Cough with yellow sputum. No " f/c. LUNDBERG. No PND/orthopnea. No LE edema. Nausea, but no emesis. Anorexia noted. Urinary incontinence is chronic. No diarrhea.     Review of Systems   Constitutional: Positive for activity change, appetite change and fatigue.   HENT: Positive for congestion.    Respiratory: Positive for cough, shortness of breath and wheezing.    Cardiovascular: Negative.    Gastrointestinal: Positive for nausea.   Genitourinary: Negative.    Musculoskeletal: Negative.    Skin: Negative.    Neurological: Positive for weakness and light-headedness.   Hematological: Negative.    Psychiatric/Behavioral: Positive for dysphoric mood.        Otherwise complete ROS reviewed and is negative except as mentioned in the HPI.    Personal History     Past Medical History:   Diagnosis Date   • CHF (congestive heart failure) (CMS/[facility]ly [facility])        History reviewed. No pertinent surgical history.    Family History: family history is not on file. Otherwise pertinent FHx was reviewed and unremarkable.     Social History:  reports that she has quit smoking. She has never used smokeless tobacco. She reports that she drinks about 4.2 oz of alcohol per week.  Social History     Social History Narrative   • Not on file       Medications:    Available home medication information reviewed.    (Not in a hospital admission)    No Known Allergies    Objective   Objective     Vital Signs:   Temp:  [98.8 °F (37.1 °C)] 98.8 °F (37.1 °C)  Heart Rate:  [] 95  Resp:  [18-20] 20  BP: (105-118)/(66-95) 118/77        Physical Exam   NAD, alert and oriented, tired appearing  OP clear, MMM  Neck supple  No LAD  Tachy  Coarse B, no wheezing, no rales  +BS, ND, NT  WILLIS, but weak  No LE edema  No obvious rashes  Flat affect    Results Reviewed:  I have personally reviewed current lab, radiology, and data and agree.    Results from last 7 days   Lab Units 05/02/19  1119   WBC 10*3/mm3 6.64   HEMOGLOBIN g/dL 13.1   HEMATOCRIT % 39.8   PLATELETS 10*3/mm3 133*     Results from  last 7 days   Lab Units 05/02/19  1119   SODIUM mmol/L 134*   POTASSIUM mmol/L 3.2*   CHLORIDE mmol/L 96*   CO2 mmol/L 22.0   BUN mg/dL 32*   CREATININE mg/dL 1.12*   GLUCOSE mg/dL 123*   CALCIUM mg/dL 9.0   ALT (SGPT) U/L 19   AST (SGOT) U/L 31     Estimated Creatinine Clearance: 39.8 mL/min (A) (by C-G formula based on SCr of 1.12 mg/dL (H)).  Brief Urine Lab Results     None        No results found for: BNP  Imaging Results (last 24 hours)     Procedure Component Value Units Date/Time    CT Chest Without Contrast [[id number]] Updated:  05/02/19 1425    XR Chest 1 View [[id number]] Collected:  05/02/19 1307     Updated:  05/02/19 1307    Narrative:       EXAMINATION: XR CHEST 1 VW-      INDICATION: Cough, shortness of air.      COMPARISON: None.     FINDINGS: Heart is enlarged. Vasculature is cephalized. Mild diffuse  interstitial changes are nonspecific but could represent early  interstitial edema. There may be minimal pleural effusion. No lung  consolidation or pneumothorax is seen.           Impression:       1. Cardiomegaly and mild pulmonary vascular congestion.  2. Mild diffuse interstitial disease, nonspecific. Very early  interstitial edema might have this appearance. Bronchitis could appear  similar. No focal pneumonia is identified.     D:  05/02/2019  E:  05/02/2019                Assessment/Plan   Assessment / Plan     Active Hospital Problems    Diagnosis POA   • **Dyspnea [R06.00] Yes   • CHF (congestive heart failure) (CMS/HCC) [I50.9] Unknown   • Hypothyroidism (acquired) [E03.9] Unknown   • Depression [F32.9] Unknown   • Atrial fibrillation (CMS/HCC) [I48.91] Unknown   • Acute bronchitis [J20.9] Unknown     Progressive dyspnea, likely bronchitis v. Pneumonia, CT chest pending    Bronchitis/PNA  --Rocephin/doxy  --nebs  --CT pending  --respiratory pcr, sputum culture  Hx of CHF, with pulmonary vascular congestion on CXR  --no edema, no PND/orthopnea, however  --s/p diuretics, ECHO pending  Hx of  Afib  --s/p Watchman's  HTN  Depression  Hypothyroidism    DVT prophylaxis:  BOOKER    CODE STATUS:    Code Status and Medical Interventions:   Ordered at: 05/02/19 1413     Level Of Support Discussed With:    Patient     Code Status:    CPR     Medical Interventions (Level of Support Prior to Arrest):    Full           Electronically signed by Byron Botello MD, 05/02/19, 2:32 PM.          Electronically signed by Byron Botello MD at 5/2/2019  2:38 PM       ICU Vital Signs     Row Name 05/03/19 0820 05/03/19 0600 05/03/19 0411 05/03/19 0400 05/03/19 0200       Vitals    Temp  96.5 °F (35.8 °C)  --  97.5 °F (36.4 °C)  --  --    Temp src  Oral  --  Oral  --  --    Pulse  87  --  98  --  --    Heart Rate Source  Monitor  --  Monitor  --  --    Resp  18  --  18  --  --    Resp Rate Source  Visual  --  Visual  --  --    BP  96/64  --  94/62  --  --    Noninvasive MAP (mmHg)  73  --  74  --  --    BP Location  Right arm  --  Right arm  --  --    BP Method  Automatic  --  Automatic  --  --    Patient Position  Lying  --  Lying  --  --       Oxygen Therapy    SpO2  92 %  --  94 %  --  --    Pulse Oximetry Type  Continuous  --  --  --  --    Device (Oxygen Therapy)  nasal cannula  nasal cannula  --  nasal cannula  nasal cannula    Flow (L/min)  2  2  --  2  2    Row Name 05/03/19 0000 05/02/19 2356 05/02/19 2200 05/02/19 2129 05/02/19 2125       Vitals    Temp  --  98.4 °F (36.9 °C)  --  --  --    Temp src  --  Oral  --  --  --    Pulse  --  93  --  103  112    Heart Rate Source  --  Monitor  --  --  --    Resp  --  16  --  --  --    Resp Rate Source  --  Visual  --  --  --    BP  --  92/69  --  --  --    Noninvasive MAP (mmHg)  --  73  --  --  --    BP Location  --  Right arm  --  --  --    BP Method  --  Automatic  --  --  --    Patient Position  --  Lying  --  --  --       Oxygen Therapy    SpO2  --  93 %  --  --  --    Device (Oxygen Therapy)  nasal cannula  --  nasal cannula  --  --    Flow (L/min)  2  --  2  --  --  "   Row Name 05/02/19 2045 05/02/19 2000 05/02/19 1852 05/02/19 1850 05/02/19 1547       Height and Weight    Height  --  --  --  --  170.2 cm (67\")    Weight  --  --  --  78.1 kg (172 lb 1.6 oz)  79.4 kg (175 lb)    Weight Method  --  --  --  Bed scale  --    Ideal Body Weight (IBW) (kg)  --  --  --  --  61.86    BSA (Calculated - sq m)  --  --  --  --  1.91 sq meters    BMI (Calculated)  --  --  --  --  27.4    Weight in (lb) to have BMI = 25  --  --  --  --  159.3       Vitals    Temp  --  --  --  98.8 °F (37.1 °C)  --    Temp src  --  --  --  Oral  --    Pulse  96  --  105  107  --    Heart Rate Source  Monitor  --  --  Monitor  --    Resp  16  --  --  18  --    Resp Rate Source  Visual  --  --  Visual  --    BP  --  --  103/76  105/90  --    Noninvasive MAP (mmHg)  --  --  86  95  --    BP Location  --  --  Right arm  Left arm  --    BP Method  --  --  Automatic  Automatic  --    Patient Position  --  --  Lying  Lying  --       Oxygen Therapy    SpO2  90 %  --  95 %  93 %  --    Pulse Oximetry Type  Continuous  --  --  --  --    Device (Oxygen Therapy)  nasal cannula  nasal cannula  --  --  --    Flow (L/min)  2  2  --  --  --    [facility] Name 05/02/19 1430 05/02/19 1400 05/02/19 1359 05/02/19 1352 05/02/19 1230       Vitals    Pulse  104  --  105  95  93    Resp  20  --  --  20  20    BP  98/73  115/72  --  118/77  109/91    Noninvasive MAP (mmHg)  79  98  --  89  99       Oxygen Therapy    SpO2  94 %  --  91 %  93 %  96 %    Device (Oxygen Therapy)  --  --  --  --  nasal cannula    Flow (L/min)  --  --  --  --  2    [facility] Name 05/02/19 1225 05/02/19 1200 05/02/19 11:41:41 05/02/19 1130 05/02/19 1117       Vitals    Pulse  107  99  --  108  100    Heart Rate Source  Monitor  --  --  --  --    Resp  20  --  --  18  18    Resp Rate Source  Visual  --  --  --  --    BP  --  113/95  --  105/79  111/73    Noninvasive MAP (mmHg)  --  101  --  84  80       Oxygen Therapy    SpO2  93 %  93 %  --  90 %  91 %    Pulse Oximetry " Type  Continuous  --  --  --  --    Device (Oxygen Therapy)  nasal cannula  --  nasal cannula  --  --    Flow (L/min)  2  --  2  --  --        Hospital Medications (all)       Dose Frequency Start End    aspirin EC tablet 81 mg 81 mg Daily 5/2/2019     Sig - Route: Take 1 tablet by mouth Daily. - Oral    azithromycin 500 MG/250 ML 0.9% NS IVPB (MBP) 500 mg Once 5/2/2019 5/2/2019    Sig - Route: Infuse 250 mL into a venous catheter 1 (One) Time. - Intravenous    bumetanide (BUMEX) injection 2 mg 2 mg Once 5/2/2019 5/2/2019    Sig - Route: Infuse 8 mL into a venous catheter 1 (One) Time. - Intravenous    bumetanide (BUMEX) tablet 0.5 mg 0.5 mg Daily 5/2/2019     Sig - Route: Take 1 tablet by mouth Daily. - Oral    carvedilol (COREG) tablet 6.25 mg 6.25 mg 2 Times Daily With Meals 5/2/2019     Sig - Route: Take 1 tablet by mouth 2 (Two) Times a Day With Meals. - Oral    cefTRIAXone (ROCEPHIN) IVPB 1 g 1 g Once 5/2/2019 5/2/2019    Sig - Route: Infuse 50 mL into a venous catheter 1 (One) Time. - Intravenous    cefTRIAXone (ROCEPHIN) IVPB 1 g 1 g Every 24 Hours 5/3/2019 5/10/2019    Sig - Route: Infuse 50 mL into a venous catheter Daily. - Intravenous    doxycycline (MONODOX) capsule 100 mg 100 mg Every 12 Hours Scheduled 5/2/2019 5/9/2019    Sig - Route: Take 1 capsule by mouth Every 12 (Twelve) Hours. - Oral    gabapentin (NEURONTIN) capsule 200 mg 200 mg Nightly 5/3/2019     Sig - Route: Take 2 capsules by mouth Every Night. - Oral    guaiFENesin (MUCINEX) 12 hr tablet 600 mg 600 mg Every 12 Hours Scheduled 5/2/2019     Sig - Route: Take 1 tablet by mouth Every 12 (Twelve) Hours. - Oral    heparin (porcine) 5000 UNIT/ML injection 5,000 Units 5,000 Units Every 8 Hours Scheduled 5/2/2019     Sig - Route: Inject 1 mL under the skin into the appropriate area as directed Every 8 (Eight) Hours. - Subcutaneous    hydrochlorothiazide (HYDRODIURIL) tablet 12.5 mg 12.5 mg Daily 5/2/2019     Sig - Route: Take 1 tablet by mouth  "Daily. - Oral    ipratropium-albuterol (DUO-NEB) nebulizer solution 3 mL 3 mL Once 5/2/2019 5/2/2019    Sig - Route: Take 3 mL by nebulization 1 (One) Time. - Nebulization    ipratropium-albuterol (DUO-NEB) nebulizer solution 3 mL 3 mL 4 Times Daily - RT 5/2/2019     Sig - Route: Take 3 mL by nebulization 4 (Four) Times a Day. - Nebulization    ipratropium-albuterol (DUO-NEB) nebulizer solution 3 mL 3 mL Every 4 Hours PRN 5/2/2019     Sig - Route: Take 3 mL by nebulization Every 4 (Four) Hours As Needed for Shortness of Air. - Nebulization    levothyroxine (SYNTHROID, LEVOTHROID) tablet 50 mcg 50 mcg Every Early Morning 5/3/2019     Sig - Route: Take 1 tablet by mouth Every Morning. - Oral    Magnesium Sulfate 2 gram / 50mL Infusion (GIVE X 3 BAGS TO EQUAL 6GM TOTAL DOSE) - Mg 1.1 - 1.5 mg/dl 2 g As Needed 5/2/2019     Sig - Route: Infuse 50 mL into a venous catheter As Needed (See Administration Instructions). - Intravenous    Linked Group 1:  \"Or\" Linked Group Details        Magnesium Sulfate 2 gram Bolus, followed by 8 gram infusion (total Mg dose 10 grams)- Mg less than or equal to 1mg/dL 2 g As Needed 5/2/2019     Sig - Route: Infuse 50 mL into a venous catheter As Needed (See Administration Instructions). - Intravenous    Linked Group 1:  \"Or\" Linked Group Details        Magnesium Sulfate 4 gram infusion- Mg 1.6-1.9 mg/dL 4 g As Needed 5/2/2019     Sig - Route: Infuse 100 mL into a venous catheter As Needed (See Administration Instructions). - Intravenous    Linked Group 1:  \"Or\" Linked Group Details        pantoprazole (PROTONIX) EC tablet 40 mg 40 mg Daily 5/2/2019     Sig - Route: Take 1 tablet by mouth Daily. - Oral    potassium chloride (KLOR-CON) packet 40 mEq 40 mEq As Needed 5/2/2019     Sig - Route: Take 40 mEq by mouth As Needed (potassium replacement, see admin instructions). - Oral    Linked Group 2:  \"Or\" Linked Group Details        potassium chloride (MICRO-K) CR capsule 40 mEq 40 mEq As Needed " "5/2/2019     Sig - Route: Take 4 capsules by mouth As Needed (Potassium Replacement.  See Admin Instructions). - Oral    Linked Group 2:  \"Or\" Linked Group Details        potassium chloride 10 mEq in 100 mL IVPB 10 mEq Every 1 Hour PRN 5/2/2019     Sig - Route: Infuse 100 mL into a venous catheter Every 1 (One) Hour As Needed (Potassium Replacement - See Admin Instructions). - Intravenous    Linked Group 2:  \"Or\" Linked Group Details        saccharomyces boulardii (FLORASTOR) capsule 250 mg 250 mg 2 Times Daily 5/2/2019     Sig - Route: Take 1 capsule by mouth 2 (Two) Times a Day. - Oral    sertraline (ZOLOFT) tablet 100 mg 100 mg Daily 5/2/2019     Sig - Route: Take 1 tablet by mouth Daily. - Oral    sodium chloride 0.9 % flush 10 mL 10 mL As Needed 5/2/2019     Sig - Route: Infuse 10 mL into a venous catheter As Needed for Line Care. - Intravenous    Cosign for Ordering: Accepted by Mike Verma MD on 5/2/2019  8:59 PM    sodium chloride 0.9 % flush 3 mL 3 mL Every 12 Hours Scheduled 5/2/2019     Sig - Route: Infuse 3 mL into a venous catheter Every 12 (Twelve) Hours. - Intravenous    sodium chloride 0.9 % flush 3-10 mL 3-10 mL As Needed 5/2/2019     Sig - Route: Infuse 3-10 mL into a venous catheter As Needed for Line Care. - Intravenous    cefTRIAXone (ROCEPHIN) IVPB 1 g (Discontinued) 1 g Every 24 Hours 5/2/2019 5/2/2019    Sig - Route: Infuse 50 mL into a venous catheter Daily. - Intravenous    furosemide (LASIX) injection 40 mg (Discontinued) 40 mg Once 5/2/2019 5/2/2019    Sig - Route: Infuse 4 mL into a venous catheter 1 (One) Time. - Intravenous    levothyroxine (SYNTHROID, LEVOTHROID) tablet 50 mcg (Discontinued) 50 mcg Daily 5/2/2019 5/2/2019    Sig - Route: Take 1 tablet by mouth Daily. - Oral            Lab Results (last 24 hours)     Procedure Component Value Units Date/Time    CBC (No Diff) [[id number]]  (Abnormal) Collected:  05/03/19 0235    Specimen:  Blood Updated:  05/03/19 0509     WBC " 4.64 10*3/mm3      RBC 3.47 10*6/mm3      Hemoglobin 11.4 g/dL      Hematocrit 33.6 %      MCV 96.8 fL      MCH 32.9 pg      MCHC 33.9 g/dL      RDW 14.0 %      RDW-SD 49.5 fl      MPV 10.8 fL      Platelets 124 10*3/mm3     Hemoglobin A1c [[id number]]  (Normal) Collected:  05/03/19 0235    Specimen:  Blood Updated:  05/03/19 0449     Hemoglobin A1C 4.80 %     Narrative:       Hemoglobin A1C Ranges:    Increased Risk for Diabetes  5.7% to 6.4%  Diabetes                     >= 6.5%  Diabetic Goal                < 7.0%    TSH [[id number]]  (Normal) Collected:  05/03/19 0235    Specimen:  Blood Updated:  05/03/19 0419     TSH 3.620 mIU/mL     Basic Metabolic Panel [[id number]]  (Abnormal) Collected:  05/03/19 0235    Specimen:  Blood Updated:  05/03/19 0415     Glucose 95 mg/dL      BUN 27 mg/dL      Creatinine 1.13 mg/dL      Sodium 136 mmol/L      Potassium 3.1 mmol/L      Chloride 97 mmol/L      CO2 27.0 mmol/L      Calcium 8.4 mg/dL      eGFR Non African Amer 46 mL/min/1.73      BUN/Creatinine Ratio 23.9     Anion Gap 12.0 mmol/L     Narrative:       GFR Normal >60  Chronic Kidney Disease <60  Kidney Failure <15    Respiratory Panel, PCR - Swab, Nasopharynx [[id number]]  (Abnormal) Collected:  05/02/19 1901    Specimen:  Swab from Nasopharynx Updated:  05/02/19 2007     ADENOVIRUS, PCR Not Detected     Coronavirus 229E Not Detected     Coronavirus HKU1 Not Detected     Coronavirus NL63 Not Detected     Coronavirus OC43 Not Detected     Human Metapneumovirus Not Detected     Human Rhinovirus/Enterovirus Not Detected     Influenza B PCR Not Detected     Parainfluenza Virus 1 Not Detected     Parainfluenza Virus 2 Not Detected     Parainfluenza Virus 3 Detected     Parainfluenza Virus 4 Not Detected     Bordetella pertussis pcr Not Detected     Influenza A H1 2009 PCR Not Detected     Chlamydophila pneumoniae PCR Not Detected     Mycoplasma pneumo by PCR Not Detected     Influenza A PCR Not Detected     Influenza A H3 Not  Detected     Influenza A H1 Not Detected     RSV, PCR Not Detected     Bordetella parapertussis PCR Not Detected    Lactic Acid, Reflex [[id number]]  (Normal) Collected:  05/02/19 1902    Specimen:  Blood Updated:  05/02/19 1928     Lactate 1.9 mmol/L      Comment: Falsely depressed results may occur on samples drawn from patients receiving N-Acetylcysteine (NAC) or Metamizole.       Lactic Acid, Reflex Timer (This will reflex a repeat order 3-3:15 hours after ordered.) [[id number]] Collected:  05/02/19 1248    Specimen:  Blood Updated:  05/02/19 1631     Extra Tube Hold for add-ons.     Comment: Auto resulted.       Lactic Acid, Plasma [[id number]]  (Abnormal) Collected:  05/02/19 1248    Specimen:  Blood Updated:  05/02/19 1319     Lactate 2.2 mmol/L      Comment: Falsely depressed results may occur on samples drawn from patients receiving N-Acetylcysteine (NAC) or Metamizole.       [place] Draw [[id number]] Collected:  05/02/19 1119    Specimen:  Blood Updated:  05/02/19 1317    Narrative:       The following orders were created for panel order [place] Draw.  Procedure                               Abnormality         Status                     ---------                               -----------         ------                     Light Blue Top[[id number]]                                   Final result               Green Top (Gel)[[id number]]                                  Final result               Lavender Top[[id number]]                                     Final result               Gold Top - SST[[id number]]                                   Final result               Green Top (No Gel)[[id number]]                                                            Please view results for these tests on the individual orders.    Blood Culture - Blood, Arm, Right [[id number]] Collected:  05/02/19 1248    Specimen:  Blood from Arm, Right Updated:  05/02/19 1314    Blood Culture - Blood, Hand, Left [[id number]] Collected:   05/02/19 1248    Specimen:  Blood from Hand, Left Updated:  05/02/19 1314    Light Blue Top [[id number]] Collected:  05/02/19 1119    Specimen:  Blood Updated:  05/02/19 1231     Extra Tube hold for add-on     Comment: Auto resulted       Green Top (Gel) [[id number]] Collected:  05/02/19 1119    Specimen:  Blood Updated:  05/02/19 1231     Extra Tube Hold for add-ons.     Comment: Auto resulted.       Lavender Top [[id number]] Collected:  05/02/19 1119    Specimen:  Blood Updated:  05/02/19 1231     Extra Tube hold for add-on     Comment: Auto resulted       Gold Top - SST [[id number]] Collected:  05/02/19 1119    Specimen:  Blood Updated:  05/02/19 1231     Extra Tube Hold for add-ons.     Comment: Auto resulted.       Comprehensive Metabolic Panel [[id number]]  (Abnormal) Collected:  05/02/19 1119    Specimen:  Blood Updated:  05/02/19 1228     Glucose 123 mg/dL      BUN 32 mg/dL      Creatinine 1.12 mg/dL      Sodium 134 mmol/L      Potassium 3.2 mmol/L      Chloride 96 mmol/L      CO2 22.0 mmol/L      Calcium 9.0 mg/dL      Total Protein 7.6 g/dL      Albumin 3.90 g/dL      ALT (SGPT) 19 U/L      AST (SGOT) 31 U/L      Alkaline Phosphatase 63 U/L      Total Bilirubin 1.9 mg/dL      eGFR Non African Amer 46 mL/min/1.73      Globulin 3.7 gm/dL      A/G Ratio 1.1 g/dL      BUN/Creatinine Ratio 28.6     Anion Gap 16.0 mmol/L     Narrative:       GFR Normal >60  Chronic Kidney Disease <60  Kidney Failure <15    Troponin [[id number]]  (Normal) Collected:  05/02/19 1119    Specimen:  Blood Updated:  05/02/19 1228     Troponin T <0.010 ng/mL     Narrative:       Troponin T Reference Range:  <= 0.03 ng/mL-   Negative for AMI  >0.03 ng/mL-     Abnormal for myocardial necrosis.  Clinicians would have to utilize clinical acumen, EKG, Troponin and serial changes to determine if it is an Acute Myocardial Infarction or myocardial injury due to an underlying chronic condition.     BNP [[id number]]  (Abnormal) Collected:  05/02/19 1119     Specimen:  Blood Updated:  05/02/19 1224     proBNP 18,779.0 pg/mL     Narrative:       Among patients with dyspnea, NT-proBNP is highly sensitive for the detection of acute congestive heart failure. In addition NT-proBNP of <300 pg/ml effectively rules out acute congestive heart failure with 99% negative predictive value.    CBC & Differential [[id number]] Collected:  05/02/19 1119    Specimen:  Blood Updated:  05/02/19 1205    Narrative:       The following orders were created for panel order CBC & Differential.  Procedure                               Abnormality         Status                     ---------                               -----------         ------                     CBC Auto Differential[[id number]]        Abnormal            Final result                 Please view results for these tests on the individual orders.    CBC Auto Differential [[id number]]  (Abnormal) Collected:  05/02/19 1119    Specimen:  Blood Updated:  05/02/19 1205     WBC 6.64 10*3/mm3      RBC 4.05 10*6/mm3      Hemoglobin 13.1 g/dL      Hematocrit 39.8 %      MCV 98.3 fL      MCH 32.3 pg      MCHC 32.9 g/dL      RDW 14.2 %      RDW-SD 51.0 fl      MPV 11.3 fL      Platelets 133 10*3/mm3      Neutrophil % 80.0 %      Lymphocyte % 12.3 %      Monocyte % 7.4 %      Eosinophil % 0.0 %      Basophil % 0.3 %      Immature Grans % 0.3 %      Neutrophils, Absolute 5.31 10*3/mm3      Lymphocytes, Absolute 0.82 10*3/mm3      Monocytes, Absolute 0.49 10*3/mm3      Eosinophils, Absolute 0.00 10*3/mm3      Basophils, Absolute 0.02 10*3/mm3      Immature Grans, Absolute 0.02 10*3/mm3         Imaging Results (last 24 hours)     Procedure Component Value Units Date/Time    XR Chest 1 View [[id number]] Collected:  05/03/19 0832     Updated:  05/03/19 0832    Narrative:       EXAMINATION: XR CHEST 1 VW-      INDICATION: Pneumonia; I50.9-Heart failure, unspecified;  R09.02-Hypoxemia.      COMPARISON: 05/02/2019.     FINDINGS: Heart is enlarged.  Vasculature appears upper limits of normal.  There is very mild bibasilar discoid atelectasis, stable on the left and  new on the right. There is probably trace right effusion.           Impression:       Cardiomegaly and mild bibasilar discoid atelectasis. No  evidence of overt congestive failure.     D:  05/03/2019  E:  05/03/2019       XR Chest 1 View [[id number]] Collected:  05/02/19 1307     Updated:  05/02/19 2203    Narrative:       EXAMINATION: XR CHEST 1 VW-      INDICATION: Cough, shortness of air.      COMPARISON: None.     FINDINGS: Heart is enlarged. Vasculature is cephalized. Mild diffuse  interstitial changes are nonspecific but could represent early  interstitial edema. There may be minimal pleural effusion. No lung  consolidation or pneumothorax is seen.           Impression:       1. Cardiomegaly and mild pulmonary vascular congestion.  2. Mild diffuse interstitial disease, nonspecific. Very early  interstitial edema might have this appearance. Bronchitis could appear  similar. No focal pneumonia is identified.     D:  05/02/2019  E:  05/02/2019     This report was finalized on 5/2/2019 10:00 PM by DR. Byron Monaco MD.       CT Chest Without Contrast [[id number]] Collected:  05/02/19 1655     Updated:  05/02/19 1655    Narrative:       EXAMINATION: CT CHEST WO CONTRAST-05/02/2019:      INDICATION: Dyspnea; I50.9-Heart failure, unspecified; R09.02-Hypoxemia.     TECHNIQUE: 5 mm unenhanced images through the chest and upper abdomen.     The radiation dose reduction device was turned on for each scan per the  ALARA (As Low as Reasonably Achievable) protocol.     COMPARISON: NONE.     FINDINGS: History indicates dyspnea. Mediastinal window images show  shotty mediastinal lymph nodes but no significant adenopathy. The heart  is enlarged. Trace pericardial effusion. There are minimal pleural  effusions. Lung window images show patchy bibasilar interstitial  disease, peribronchial thickening, and minimal  effusions. Appearance of  the lower lungs could reflect bronchitis or aspiration. No similar  changes are seen elsewhere. A view subpleural micronodules are present  in the lung apices, presumably old granulomatous disease. No significant  nodularity is seen elsewhere.     The included images of the upper abdomen show a water-density round 2.2  cm left lobe liver cyst, and a couple of other subcentimeter cysts. No  significant abnormalities are seen in the included portions of the  spleen, adrenal glands, or upper renal poles.       Impression:       1.  Moderate patchy bibasilar disease, small effusions and diffuse  peribronchial thickening. Consider pneumonia and aspiration. The patient  might have these findings with waxing and waning CHF as well.  2.  Cardiomegaly, trace pericardial effusion and shotty mediastinal  lymph nodes, nonspecific.  3.  Incidentally noted small hepatic cysts.     D:  05/02/2019  E:  05/02/2019

## 2019-05-03 NOTE — PLAN OF CARE
Problem: Patient Care Overview  Goal: Plan of Care Review  Outcome: Ongoing (interventions implemented as appropriate)   05/03/19 1936   Coping/Psychosocial   Plan of Care Reviewed With patient;family   Plan of Care Review   Progress no change   OTHER   Outcome Summary Alert and oriented. Respirations unlabored and currently on room air trial per patient request. Afib with frequent PVCs. Morning Coreg held for hypotension. Potassium replenished oral. Cardiology consult and Echo completed. Still attempting to get medical records. Urine and sputum specimens obtained. Blood cultures positive and Dr Short notified. Refused recliner due to fatigue. Purewick in place. Family bedside majority shift.

## 2019-05-04 LAB
BACTERIA SPEC AEROBE CULT: ABNORMAL
GRAM STN SPEC: ABNORMAL
ISOLATED FROM: ABNORMAL
MAGNESIUM SERPL-MCNC: 1.7 MG/DL (ref 1.6–2.4)
POTASSIUM BLD-SCNC: 3.9 MMOL/L (ref 3.5–5.2)
PROCALCITONIN SERPL-MCNC: 0.1 NG/ML (ref 0.1–0.25)

## 2019-05-04 PROCEDURE — 99231 SBSQ HOSP IP/OBS SF/LOW 25: CPT | Performed by: INTERNAL MEDICINE

## 2019-05-04 PROCEDURE — 99233 SBSQ HOSP IP/OBS HIGH 50: CPT | Performed by: INTERNAL MEDICINE

## 2019-05-04 PROCEDURE — 94799 UNLISTED PULMONARY SVC/PX: CPT

## 2019-05-04 PROCEDURE — 25010000002 HEPARIN (PORCINE) PER 1000 UNITS: Performed by: HOSPITALIST

## 2019-05-04 PROCEDURE — 84145 PROCALCITONIN (PCT): CPT | Performed by: NURSE PRACTITIONER

## 2019-05-04 PROCEDURE — 83735 ASSAY OF MAGNESIUM: CPT | Performed by: INTERNAL MEDICINE

## 2019-05-04 PROCEDURE — 84132 ASSAY OF SERUM POTASSIUM: CPT | Performed by: INTERNAL MEDICINE

## 2019-05-04 RX ORDER — BISOPROLOL FUMARATE 5 MG/1
2.5 TABLET, FILM COATED ORAL
Status: DISCONTINUED | OUTPATIENT
Start: 2019-05-04 | End: 2019-05-05 | Stop reason: HOSPADM

## 2019-05-04 RX ADMIN — POLYETHYLENE GLYCOL 3350 17 G: 17 POWDER, FOR SOLUTION ORAL at 18:24

## 2019-05-04 RX ADMIN — IPRATROPIUM BROMIDE AND ALBUTEROL SULFATE 3 ML: 2.5; .5 SOLUTION RESPIRATORY (INHALATION) at 15:57

## 2019-05-04 RX ADMIN — BISOPROLOL FUMARATE 2.5 MG: 5 TABLET ORAL at 15:23

## 2019-05-04 RX ADMIN — IPRATROPIUM BROMIDE AND ALBUTEROL SULFATE 3 ML: 2.5; .5 SOLUTION RESPIRATORY (INHALATION) at 12:37

## 2019-05-04 RX ADMIN — LEVOTHYROXINE SODIUM 50 MCG: 50 TABLET ORAL at 05:59

## 2019-05-04 RX ADMIN — HEPARIN SODIUM 5000 UNITS: 5000 INJECTION INTRAVENOUS; SUBCUTANEOUS at 21:27

## 2019-05-04 RX ADMIN — IPRATROPIUM BROMIDE AND ALBUTEROL SULFATE 3 ML: 2.5; .5 SOLUTION RESPIRATORY (INHALATION) at 08:34

## 2019-05-04 RX ADMIN — GUAIFENESIN 600 MG: 600 TABLET, EXTENDED RELEASE ORAL at 09:44

## 2019-05-04 RX ADMIN — SODIUM CHLORIDE, PRESERVATIVE FREE 3 ML: 5 INJECTION INTRAVENOUS at 09:45

## 2019-05-04 RX ADMIN — BUMETANIDE 1 MG: 0.25 INJECTION INTRAMUSCULAR; INTRAVENOUS at 15:23

## 2019-05-04 RX ADMIN — SERTRALINE HYDROCHLORIDE 100 MG: 100 TABLET ORAL at 09:45

## 2019-05-04 RX ADMIN — MAGNESIUM SULFATE HEPTAHYDRATE 4 G: 40 INJECTION, SOLUTION INTRAVENOUS at 15:23

## 2019-05-04 RX ADMIN — PANTOPRAZOLE SODIUM 40 MG: 40 TABLET, DELAYED RELEASE ORAL at 09:45

## 2019-05-04 RX ADMIN — SODIUM CHLORIDE, PRESERVATIVE FREE 3 ML: 5 INJECTION INTRAVENOUS at 21:27

## 2019-05-04 RX ADMIN — ASPIRIN 81 MG: 81 TABLET, COATED ORAL at 09:45

## 2019-05-04 RX ADMIN — HEPARIN SODIUM 5000 UNITS: 5000 INJECTION INTRAVENOUS; SUBCUTANEOUS at 15:23

## 2019-05-04 RX ADMIN — IPRATROPIUM BROMIDE AND ALBUTEROL SULFATE 3 ML: 2.5; .5 SOLUTION RESPIRATORY (INHALATION) at 19:48

## 2019-05-04 RX ADMIN — Medication 250 MG: at 21:26

## 2019-05-04 RX ADMIN — HEPARIN SODIUM 5000 UNITS: 5000 INJECTION INTRAVENOUS; SUBCUTANEOUS at 06:00

## 2019-05-04 RX ADMIN — GUAIFENESIN 600 MG: 600 TABLET, EXTENDED RELEASE ORAL at 21:26

## 2019-05-04 RX ADMIN — GABAPENTIN 200 MG: 100 CAPSULE ORAL at 21:26

## 2019-05-04 RX ADMIN — Medication 250 MG: at 09:45

## 2019-05-04 NOTE — PROGRESS NOTES
[facility] Medicine Services  PROGRESS NOTE    Patient Name: Liz Lester  : 1934  MRN: [id number]    Date of Admission: 2019  Length of Stay: 2  Primary Care Physician: Provider, No Known    Subjective   Subjective     CC:  SOA    HPI:  No issues overnight, feels a bit better.     Review of Systems  Gen- No fevers, chills  CV- No chest pain, palpitations  Resp- + cough, + dyspnea  GI- No N/V/D, abd pain    Otherwise ROS is negative except as mentioned in the HPI.    Objective   Objective     Vital Signs:   Temp:  [97.1 °F (36.2 °C)-98.9 °F (37.2 °C)] 97.1 °F (36.2 °C)  Heart Rate:  [] 121  Resp:  [15-20] 16  BP: (85-96)/(53-75) 95/75        Physical Exam:  Constitutional: No acute distress, awake, alert  HENT: NCAT, mucous membranes moist  Respiratory: crackles at bases bilaterally  Cardiovascular: RRR, no murmurs, rubs, or gallops, palpable pedal pulses bilaterally  Gastrointestinal: Positive bowel sounds, soft, nontender, nondistended  Musculoskeletal: No bilateral ankle edema  Psychiatric: Appropriate affect, cooperative  Neurologic: Oriented x 3, strength symmetric in all extremities, Cranial Nerves grossly intact to confrontation, speech clear  Skin: No rashes  Results Reviewed:  I have personally reviewed current lab, radiology, and data and agree.    Results from last 7 days   Lab Units 19  0235 19  1119   WBC 10*3/mm3 4.64 6.64   HEMOGLOBIN g/dL 11.4* 13.1   HEMATOCRIT % 33.6* 39.8   PLATELETS 10*3/mm3 124* 133*     Results from last 7 days   Lab Units 19  0638 19  0235 19  1119   SODIUM mmol/L  --  136 134*   POTASSIUM mmol/L 3.9 3.1* 3.2*   CHLORIDE mmol/L  --  97* 96*   CO2 mmol/L  --  27.0 22.0   BUN mg/dL  --  27* 32*   CREATININE mg/dL  --  1.13* 1.12*   GLUCOSE mg/dL  --  95 123*   CALCIUM mg/dL  --  8.4* 9.0   ALT (SGPT) U/L  --   --  19   AST (SGOT) U/L  --   --  31     Estimated Creatinine Clearance: 39.2 mL/min (A) (by  C-G formula based on SCr of 1.13 mg/dL (H)).    No results found for: BNP    Microbiology Results Abnormal     Procedure Component Value - Date/Time    Respiratory Culture - Sputum, Cough [[id number]] Collected:  05/03/19 1206    Lab Status:  Preliminary result Specimen:  Sputum from Cough Updated:  05/04/19 1039     Respiratory Culture Light growth (2+) Normal Respiratory Stacy     Gram Stain Many (4+) WBCs per low power field      Rare (1+) Epithelial cells per low power field      Rare (1+) Mixed bacterial morphotypes seen on Gram Stain    Blood Culture - Blood, Hand, Left [[id number]]  (Abnormal) Collected:  05/02/19 1248    Lab Status:  Final result Specimen:  Blood from Hand, Left Updated:  05/04/19 0634     Blood Culture Staphylococcus, coagulase negative     Comment: Probable contaminant requires clinical correlation, susceptibility not performed unless requested by physician.          Isolated from Anaerobic Bottle     Gram Stain Anaerobic Bottle Gram positive cocci in clusters    Blood Culture ID, PCR - Blood, Hand, Left [[id number]]  (Abnormal) Collected:  05/02/19 1248    Lab Status:  Final result Specimen:  Blood from Hand, Left Updated:  05/03/19 2217     BCID, PCR Staphylococcus spp, not aureus. Identification by BCID PCR.    S. Pneumo Ag Urine or CSF - Urine, Urine, Clean Catch [[id number]]  (Normal) Collected:  05/03/19 1524    Lab Status:  Final result Specimen:  Urine, Clean Catch Updated:  05/03/19 1855     Strep Pneumo Ag Negative    Legionella Antigen, Urine - Urine, Urine, Clean Catch [[id number]]  (Normal) Collected:  05/03/19 1524    Lab Status:  Final result Specimen:  Urine, Clean Catch Updated:  05/03/19 1855     LEGIONELLA ANTIGEN, URINE Negative    Blood Culture - Blood, Arm, Right [[id number]] Collected:  05/02/19 1248    Lab Status:  Preliminary result Specimen:  Blood from Arm, Right Updated:  05/03/19 1315     Blood Culture No growth at 24 hours    Respiratory Panel, PCR - Swab,  Nasopharynx [[id number]]  (Abnormal) Collected:  05/02/19 1901    Lab Status:  Final result Specimen:  Swab from Nasopharynx Updated:  05/02/19 2007     ADENOVIRUS, PCR Not Detected     Coronavirus 229E Not Detected     Coronavirus HKU1 Not Detected     Coronavirus NL63 Not Detected     Coronavirus OC43 Not Detected     Human Metapneumovirus Not Detected     Human Rhinovirus/Enterovirus Not Detected     Influenza B PCR Not Detected     Parainfluenza Virus 1 Not Detected     Parainfluenza Virus 2 Not Detected     Parainfluenza Virus 3 Detected     Parainfluenza Virus 4 Not Detected     Bordetella pertussis pcr Not Detected     Influenza A H1 2009 PCR Not Detected     Chlamydophila pneumoniae PCR Not Detected     Mycoplasma pneumo by PCR Not Detected     Influenza A PCR Not Detected     Influenza A H3 Not Detected     Influenza A H1 Not Detected     RSV, PCR Not Detected     Bordetella parapertussis PCR Not Detected          Imaging Results (last 24 hours)     Procedure Component Value Units Date/Time    CT Chest Without Contrast [[id number]] Collected:  05/02/19 1655     Updated:  05/03/19 2253    Narrative:       EXAMINATION: CT CHEST WO CONTRAST-05/02/2019:      INDICATION: Dyspnea; I50.9-Heart failure, unspecified; R09.02-Hypoxemia.     TECHNIQUE: 5 mm unenhanced images through the chest and upper abdomen.     The radiation dose reduction device was turned on for each scan per the  ALARA (As Low as Reasonably Achievable) protocol.     COMPARISON: NONE.     FINDINGS: History indicates dyspnea. Mediastinal window images show  shotty mediastinal lymph nodes but no significant adenopathy. The heart  is enlarged. Trace pericardial effusion. There are minimal pleural  effusions. Lung window images show patchy bibasilar interstitial  disease, peribronchial thickening, and minimal effusions. Appearance of  the lower lungs could reflect bronchitis or aspiration. No similar  changes are seen elsewhere. A few subpleural  micronodules are present in  the lung apices, presumably old granulomatous disease. No significant  nodularity is seen elsewhere.     The included images of the upper abdomen show a water-density round 2.2  cm left lobe liver cyst, and a couple of other subcentimeter cysts. No  significant abnormalities are seen in the included portions of the  spleen, adrenal glands, or upper renal poles.       Impression:       1.  Moderate patchy bibasilar disease, small effusions and diffuse  peribronchial thickening. Consider pneumonia and aspiration. The patient  might have these findings with waxing and waning CHF as well.  2.  Cardiomegaly, trace pericardial effusion and shotty mediastinal  lymph nodes, nonspecific.  3.  Incidentally noted small hepatic cysts.     D:  05/02/2019  E:  05/02/2019           This report was finalized on 5/3/2019 10:49 PM by DR. Byron Monaoc MD.       XR Chest 1 View [[id number]] Collected:  05/03/19 0832     Updated:  05/03/19 1732    Narrative:       EXAMINATION: XR CHEST 1 VW-      INDICATION: Pneumonia; I50.9-Heart failure, unspecified;  R09.02-Hypoxemia.      COMPARISON: 05/02/2019.     FINDINGS: Heart is enlarged. Vasculature appears upper limits of normal.  There is very mild bibasilar discoid atelectasis, stable on the left and  new on the right. There is probably trace right effusion.           Impression:       Cardiomegaly and mild bibasilar discoid atelectasis. No  evidence of overt congestive failure.     D:  05/03/2019  E:  05/03/2019     This report was finalized on 5/3/2019 5:29 PM by DR. Byron Monaco MD.             Results for orders placed during the hospital encounter of 05/02/19   Adult Transthoracic Echo Limited W/ Cont if Necessary Per Protocol    Narrative · Estimated EF = 25%.          I have reviewed the medications:    Current Facility-Administered Medications:   •  aspirin EC tablet 81 mg, 81 mg, Oral, Daily, Byron Botello MD, 81 mg at 05/04/19 0913  •  bumetanide  (BUMEX) injection 1 mg, 1 mg, Intravenous, Daily, Marbin Wetzel MD  •  carvedilol (COREG) tablet 3.125 mg, 3.125 mg, Oral, BID With Meals, Marbin Wetzel MD, 3.125 mg at 05/03/19 1852  •  gabapentin (NEURONTIN) capsule 200 mg, 200 mg, Oral, Nightly, Jatin, Lucia Rod MD, 200 mg at 05/03/19 2203  •  guaiFENesin (MUCINEX) 12 hr tablet 600 mg, 600 mg, Oral, Q12H, Byron Botello MD, 600 mg at 05/04/19 0944  •  heparin (porcine) 5000 UNIT/ML injection 5,000 Units, 5,000 Units, Subcutaneous, Q8H, Byron Botello MD, 5,000 Units at 05/04/19 0600  •  ipratropium-albuterol (DUO-NEB) nebulizer solution 3 mL, 3 mL, Nebulization, 4x Daily - RT, Byron Botello MD, 3 mL at 05/04/19 0834  •  ipratropium-albuterol (DUO-NEB) nebulizer solution 3 mL, 3 mL, Nebulization, Q4H PRN, Byron Botello MD  •  levothyroxine (SYNTHROID, LEVOTHROID) tablet 50 mcg, 50 mcg, Oral, Q AM, Byron Botello MD, 50 mcg at 05/04/19 0559  •  Magnesium Sulfate 2 gram Bolus, followed by 8 gram infusion (total Mg dose 10 grams)- Mg less than or equal to 1mg/dL, 2 g, Intravenous, PRN **OR** Magnesium Sulfate 2 gram / 50mL Infusion (GIVE X 3 BAGS TO EQUAL 6GM TOTAL DOSE) - Mg 1.1 - 1.5 mg/dl, 2 g, Intravenous, PRN **OR** Magnesium Sulfate 4 gram infusion- Mg 1.6-1.9 mg/dL, 4 g, Intravenous, PRN, Byron Botello MD  •  pantoprazole (PROTONIX) EC tablet 40 mg, 40 mg, Oral, Daily, Byron Botello MD, 40 mg at 05/04/19 0945  •  potassium chloride (MICRO-K) CR capsule 40 mEq, 40 mEq, Oral, PRN, 40 mEq at 05/03/19 1315 **OR** potassium chloride (KLOR-CON) packet 40 mEq, 40 mEq, Oral, PRN, 40 mEq at 05/03/19 0930 **OR** potassium chloride 10 mEq in 100 mL IVPB, 10 mEq, Intravenous, Q1H PRN, Byron Botello MD  •  saccharomyces boulardii (FLORASTOR) capsule 250 mg, 250 mg, Oral, BID, Byron Botello MD, 250 mg at 05/04/19 0945  •  sertraline (ZOLOFT) tablet 100 mg, 100 mg, Oral, Daily, Byron Botello MD, 100 mg at 05/04/19 0945  •  sodium  chloride 0.9 % flush 10 mL, 10 mL, Intravenous, PRN, Mike Verma MD  •  sodium chloride 0.9 % flush 3 mL, 3 mL, Intravenous, Q12H, Byron Botello MD, 3 mL at 05/04/19 0945  •  sodium chloride 0.9 % flush 3-10 mL, 3-10 mL, Intravenous, PRN, Byron Botello MD      Assessment/Plan   Assessment / Plan     Active Hospital Problems    Diagnosis POA   • **Parainfluenza virus pneumonia [J12.2] Unknown     Priority: High   • Dyspnea [R06.00] Yes     Priority: High   • Hypokalemia [E87.6] Unknown     Priority: Medium   • CHF (congestive heart failure) (CMS/HCC) [I50.9] Unknown     Priority: Medium   • Atrial fibrillation (CMS/HCC) [I48.91] Unknown     Priority: Medium   • Acute on chronic congestive heart failure (CMS/HCC) [I50.9] Yes     Priority: Medium   • Hypothyroidism (acquired) [E03.9] Unknown     Priority: Low   • Depression [F32.9] Unknown     Priority: Low          Brief Hospital Course to date:  Liz Lester is a 85 y.o. female with PMH of Afib s/p Watchman Device, Hypothyroidism, depression, and CHF who presented to the ED with complaints of dyspnea and cough productive of yellow sputum.  Pt's proBNP significantly elevated on admission, so she was treated for volume overload/CHF.  She was ultimately found to have parainfluenza virus on respiratory PCR.    Plan:    Acute viral PNA  --+ parainfluenza 3 on respiratory PCR  --will continue to treat for CAP bacterial PNA as well given high occurrence of concomitant bacterial infection with parainfluenza especially in elderly  --urine strep and Legionella Ags NEGATIVE  --blood culture shows + Coag Neg Staph in one of four cultures, suspect contaminant.  ID was consulted overnight, appreciate their input.     Acute on chronic systolic HF  H/o Afib s/p Watchman device  --TTE shows EF 30%, [place] Also unclear of etiology of CHF.    --Cards consulted, appreciate their input.   -- pt unable to tolerate beta blocker  and diuretic this am due to hypotension.  Monitor and resume when able      Lactic acidosis  --resolved    Hypokalemia  --replace per protocol    Thrombocytopenia  --mild,monitor    Normocytic Anemia  --send workup    Hypothyroidism  --continue Synthroid, TSH wnl    Depression  --continue home meds    DVT Prophylaxis:  BOOKER    Disposition: I expect the patient to be discharged TBD    CODE STATUS:   Code Status and Medical Interventions:   Ordered at: 05/02/19 1413     Level Of Support Discussed With:    Patient     Code Status:    CPR     Medical Interventions (Level of Support Prior to Arrest):    Full         Electronically signed by Lucia Steiner MD, 05/04/19, 10:58 AM.

## 2019-05-04 NOTE — PROGRESS NOTES
"  [facility]   Inpatient Progress Note       LOS: 2 days   Patient Care Team:  Provider, No Known as PCP - General    Chief Complaint:  CHF, cardiomyopathy    Subjective     Interval History:   No change in symptoms      Review of Systems:   Pertinent positives noted in history, exam, and assessment. Otherwise reviewed and negative.      Objective     Vitals:  Blood pressure 111/66, pulse 115, temperature 97.7 °F (36.5 °C), temperature source Oral, resp. rate 18, height 170.2 cm (67\"), weight 78.1 kg (172 lb 1.6 oz), SpO2 96 %.     Intake/Output Summary (Last 24 hours) at 5/4/2019 1341  Last data filed at 5/4/2019 1230  Gross per 24 hour   Intake 600 ml   Output 1600 ml   Net -1000 ml     Physical Exam   Constitutional: She is oriented to person, place, and time. She appears well-developed and well-nourished.   HENT:   Mouth/Throat: Oropharynx is clear and moist.   Neck: No JVD present. Carotid bruit is not present. No thyromegaly present.   Cardiovascular: Regular rhythm, S1 normal, S2 normal and intact distal pulses. Exam reveals no gallop, no S3 and no S4.   Murmur heard.  Pulses:       Carotid pulses are 2+ on the right side, and 2+ on the left side.       Radial pulses are 2+ on the right side, and 2+ on the left side.   Pulmonary/Chest: She has wheezes. She has rhonchi.   Abdominal: Soft. Bowel sounds are normal. She exhibits no mass. There is no tenderness.   Musculoskeletal: She exhibits no edema.   Neurological: She is alert and oriented to person, place, and time.   Skin: Skin is warm and dry. No rash noted.          Results Review:     I reviewed the patient's new clinical results.    Results from last 7 days   Lab Units 05/03/19  0235   WBC 10*3/mm3 4.64   HEMOGLOBIN g/dL 11.4*   HEMATOCRIT % 33.6*   PLATELETS 10*3/mm3 124*     Results from last 7 days   Lab Units 05/04/19  0638 05/03/19  0235 05/02/19  1119   SODIUM mmol/L  --  136 134*   POTASSIUM mmol/L 3.9 3.1* 3.2* "   CHLORIDE mmol/L  --  97* 96*   CO2 mmol/L  --  27.0 22.0   BUN mg/dL  --  27* 32*   CREATININE mg/dL  --  1.13* 1.12*   CALCIUM mg/dL  --  8.4* 9.0   BILIRUBIN mg/dL  --   --  1.9*   ALK PHOS U/L  --   --  63   ALT (SGPT) U/L  --   --  19   AST (SGOT) U/L  --   --  31   GLUCOSE mg/dL  --  95 123*     Results from last 7 days   Lab Units 05/04/19  0638 05/03/19  0235   SODIUM mmol/L  --  136   POTASSIUM mmol/L 3.9 3.1*   CHLORIDE mmol/L  --  97*   CO2 mmol/L  --  27.0   BUN mg/dL  --  27*   CREATININE mg/dL  --  1.13*   GLUCOSE mg/dL  --  95   CALCIUM mg/dL  --  8.4*         No results found for: TROPONINI  Results from last 7 days   Lab Units 05/03/19  0235   TSH mIU/mL 3.620         Results from last 7 days   Lab Units 05/02/19  1119   PROBNP pg/mL 18,779.0*         Tele:  Atrial fib    Assessment/Plan       Parainfluenza virus pneumonia    Dyspnea    CHF (congestive heart failure) (CMS/HCC)    Hypothyroidism (acquired)    Depression    Atrial fibrillation (CMS/HCC)    Acute on chronic congestive heart failure (CMS/HCC)    Hypokalemia      1. Cardiomyopathy   1. LVEF 30%.    2. Unclear previous LVEF by her outside cardiologist but per patient this may be new.    3. She notes several months of progressive dyspnea on exertion prior to this admission.    4. No known CAD.  2. Atrial fibrillation,   1. chronic with previous Watchman device,   2. intolerant to anticoagulation due to recurrent GI bleeds  3. Hypotension, chronic  4. Parainfluenza virus, symptoms most consistent with this.  This is the cause of her acute respiratory illness.      Plan:  Still suspect the majority of her symptoms are pulmonary/inflammatory in nature.  Continue gentle diuresis, will continue low-dose Coreg which she tolerated at home (this will be needed for her A. fib rate control).  Electronically signed by DICK Fry, 05/04/19, 1:41 PM.  IMarbin MD, personally performed the services described in this documentation  as scribed by the above individual in my presence, and it is both accurate and complete    Dictated utilizing Dragon dictation

## 2019-05-04 NOTE — CONSULTS
INFECTIOUS DISEASE CONSULT/INITIAL HOSPITAL VISIT    Liz Lester  1934  [id number]    Date of Consult: 5/4/2019    Admission Date: 5/2/2019      Requesting Provider: No ref. provider found  Evaluating Physician: DICK Daniels    Reason for Consultation: Parainfluenza 3 viral pneumonia    History of present illness:    Patient is a 85 y.o. female, seen today for Parainfluenza 3 viral pneumonia.  She is from [place] and is here visiting family. She flew on a plane to get here. She presented to to the ED with complaints of worsening shortness of breath, cough and weakness since Monday.   She has been afebrile without leukocytosis.  A viral respiratory PCR positive for Parainfluenza 3, Legionella and Pneumococcus antigens negative.  Recent CXR with mild bibasilar discoid atelectasis, CT of chest patchy bibasilar disease, diffuse peribronchial thickening.  Vancomycin, Doxycycline and Rocephin initiated and we were consulted for evaluation and treatment.  Single blood cx from ED + for CONS. Feels slightly better today but still weak with significant cough.       Past Medical History:   Diagnosis Date   • CHF (congestive heart failure) (CMS/[facility])        History reviewed. No pertinent surgical history.    History reviewed. No pertinent family history.    Social History     Socioeconomic History   • Marital status:      Spouse name: Not on file   • Number of children: Not on file   • Years of education: Not on file   • Highest education level: Not on file   Tobacco Use   • Smoking status: Former Smoker   • Smokeless tobacco: Never Used   • Tobacco comment: QUIT 30 YEARS AGO    Substance and Sexual Activity   • Alcohol use: Yes     Alcohol/week: 4.2 oz     Types: 7 Glasses of wine per week     Frequency: Never   • Sexual activity: No       No Known Allergies      Medication:    Current Facility-Administered Medications:   •  [START ON 5/5/2019] !Vancomycin trough 5/5 @2030. Hold 2100 dose until  pharmacist reviews result., , Does not apply, Once, Lucia Steiner MD  •  aspirin EC tablet 81 mg, 81 mg, Oral, Daily, Byron Botello MD, 81 mg at 05/03/19 0922  •  bumetanide (BUMEX) injection 1 mg, 1 mg, Intravenous, Daily, Marbin Wetzel MD  •  carvedilol (COREG) tablet 3.125 mg, 3.125 mg, Oral, BID With Meals, Marbin Wetzel MD, 3.125 mg at 05/03/19 1852  •  cefTRIAXone (ROCEPHIN) IVPB 1 g, 1 g, Intravenous, Q24H, Byron Botello MD, Last Rate: 100 mL/hr at 05/03/19 1316, 1 g at 05/03/19 1316  •  doxycycline (MONODOX) capsule 100 mg, 100 mg, Oral, Q12H, Byron Botello MD, 100 mg at 05/03/19 2202  •  gabapentin (NEURONTIN) capsule 200 mg, 200 mg, Oral, Nightly, Lucia Steiner MD, 200 mg at 05/03/19 2203  •  guaiFENesin (MUCINEX) 12 hr tablet 600 mg, 600 mg, Oral, Q12H, Byron Botello MD, 600 mg at 05/03/19 2203  •  heparin (porcine) 5000 UNIT/ML injection 5,000 Units, 5,000 Units, Subcutaneous, Q8H, Byron Botello MD, 5,000 Units at 05/04/19 0600  •  ipratropium-albuterol (DUO-NEB) nebulizer solution 3 mL, 3 mL, Nebulization, 4x Daily - RT, Byron Botello MD, 3 mL at 05/03/19 1843  •  ipratropium-albuterol (DUO-NEB) nebulizer solution 3 mL, 3 mL, Nebulization, Q4H PRN, Byron Botello MD  •  levothyroxine (SYNTHROID, LEVOTHROID) tablet 50 mcg, 50 mcg, Oral, Q AM, Byron Botello MD, 50 mcg at 05/04/19 0559  •  Magnesium Sulfate 2 gram Bolus, followed by 8 gram infusion (total Mg dose 10 grams)- Mg less than or equal to 1mg/dL, 2 g, Intravenous, PRN **OR** Magnesium Sulfate 2 gram / 50mL Infusion (GIVE X 3 BAGS TO EQUAL 6GM TOTAL DOSE) - Mg 1.1 - 1.5 mg/dl, 2 g, Intravenous, PRN **OR** Magnesium Sulfate 4 gram infusion- Mg 1.6-1.9 mg/dL, 4 g, Intravenous, PRN, Byron Botello MD  •  pantoprazole (PROTONIX) EC tablet 40 mg, 40 mg, Oral, Daily, Byron Botello MD, 40 mg at 05/03/19 0922  •  Pharmacy to dose vancomycin, , Does not apply, Continuous PRN, Kyra Short DO  •   potassium chloride (MICRO-K) CR capsule 40 mEq, 40 mEq, Oral, PRN, 40 mEq at 05/03/19 1315 **OR** potassium chloride (KLOR-CON) packet 40 mEq, 40 mEq, Oral, PRN, 40 mEq at 05/03/19 0930 **OR** potassium chloride 10 mEq in 100 mL IVPB, 10 mEq, Intravenous, Q1H PRN, Byron Botello MD  •  saccharomyces boulardii (FLORASTOR) capsule 250 mg, 250 mg, Oral, BID, Byron Botello MD, 250 mg at 05/03/19 2202  •  sertraline (ZOLOFT) tablet 100 mg, 100 mg, Oral, Daily, Byron Botello MD, 100 mg at 05/03/19 0923  •  sodium chloride 0.9 % flush 10 mL, 10 mL, Intravenous, PRN, Mike Verma MD  •  sodium chloride 0.9 % flush 3 mL, 3 mL, Intravenous, Q12H, Byron Botello MD, 3 mL at 05/03/19 2203  •  sodium chloride 0.9 % flush 3-10 mL, 3-10 mL, Intravenous, PRN, Byron Botello MD  •  vancomycin 1250 mg/250 mL 0.9% NS IVPB ([facility]), 1,250 mg, Intravenous, Q24H, Lucia Steiner MD    Antibiotics:  Anti-Infectives (From admission, onward)    Ordered     Dose/Rate Route Frequency Start Stop    05/03/19 1948  vancomycin 1250 mg/250 mL 0.9% NS IVPB ([facility])     Ordering Provider:  Lucia Steiner MD    1,250 mg  over 90 Minutes Intravenous Every 24 Hours 05/04/19 2100 05/13/19 2059 05/03/19 1948  vancomycin 2000 mg/500 mL 0.9% NS IVPB ([facility])     Ordering Provider:  Lucia Steiner MD    25 mg/kg × 78.1 kg  over 120 Minutes Intravenous Once 05/03/19 2045 05/04/19 0002    05/03/19 1943  Pharmacy to dose vancomycin     Ordering Provider:  Kyra Short DO     Does not apply Continuous PRN 05/03/19 1942 05/08/19 1941 05/02/19 1852  cefTRIAXone (ROCEPHIN) IVPB 1 g     Ordering Provider:  Byron Botello MD    1 g  100 mL/hr over 30 Minutes Intravenous Every 24 Hours 05/03/19 1300 05/10/19 1259    05/02/19 1830  doxycycline (MONODOX) capsule 100 mg     Ordering Provider:  Byron Botello MD    100 mg Oral Every 12 Hours Scheduled 05/02/19 2100 05/09/19 2059    05/02/19 1155  azithromycin 500 MG/250 ML  0.9% NS IVPB ([facility])     Ordering Provider:  Layton Gutierrez PA    500 mg  over 60 Minutes Intravenous Once 19 1157 19 1458    19 1155  cefTRIAXone (ROCEPHIN) IVPB 1 g     Ordering Provider:  BrendaCharles parrat, PA    1 g  100 mL/hr over 30 Minutes Intravenous Once 19 1157 19 1330            Review of Systems:  Constitutional-- No Fever, chills or sweats.  General malaise  HEENT-- No new vision, hearing or throat complaints.  No epistaxis or oral sores.  Denies odynophagia or dysphagia. No headache, photophobia or neck stiffness.  CV-- No chest pain, palpitation or syncope  Resp-- +  SOB+cough no Hemoptysis  GI- No nausea, vomiting, or diarrhea.  No hematochezia, melena, or hematemesis. Denies jaundice or chronic liver disease.+ constipation   --+chronic urinary incontinence   Lymph- no swollen lymph nodes in neck/axilla or groin.   Heme- No active bruising or bleeding; no Hx of DVT or PE.  MS-- no swelling or pain in the bones or joints of arms/legs.  No new back pain.  Neuro-- No acute focal weakness or numbness in the arms or legs.  No seizures.  Skin--No rashes or lesions      Physical Exam:   Vital Signs  Temp (24hrs), Av.8 °F (36.6 °C), Min:96.5 °F (35.8 °C), Max:98.9 °F (37.2 °C)    Temp  Min: 96.5 °F (35.8 °C)  Max: 98.9 °F (37.2 °C)  BP  Min: 85/69  Max: 96/72  Pulse  Min: 87  Max: 126  Resp  Min: 18  Max: 20  SpO2  Min: 91 %  Max: 97 %    GENERAL: Awake and alert, in no acute distress.   HEENT: Normocephalic, atraumatic.  PERRL. EOMI. No conjunctival injection. No icterus. Oropharynx clear without evidence of thrush or exudate. No evidence of peridontal disease.    NECK: Supple without nuchal rigidity. No mass.  LYMPH: No cervical, lymphadenopathy.  HEART: irreg rate/rhythm; No murmur, rubs, gallops.   LUNGS: wheezes bilaterally. Non-productive cough.   ABDOMEN: Soft, nontender, nondistended. Positive bowel sounds. No rebound or guarding. NO mass or HSM.  EXT:  No cyanosis,  clubbing or edema. No cord.  :    Without Bustos catheter.  MSK: FROM without joint effusions noted arms/legs.    SKIN: Warm and dry without cutaneous eruptions on Inspection/palpation.    NEURO: Oriented to PPT. No focal deficits on motor/sensory exam at arms/legs.  PSYCHIATRIC: Normal insight and judgement. Cooperative with PE    Laboratory Data    Results from last 7 days   Lab Units 05/03/19  0235 05/02/19  1119   WBC 10*3/mm3 4.64 6.64   HEMOGLOBIN g/dL 11.4* 13.1   HEMATOCRIT % 33.6* 39.8   PLATELETS 10*3/mm3 124* 133*     Results from last 7 days   Lab Units 05/03/19  0235   SODIUM mmol/L 136   POTASSIUM mmol/L 3.1*   CHLORIDE mmol/L 97*   CO2 mmol/L 27.0   BUN mg/dL 27*   CREATININE mg/dL 1.13*   GLUCOSE mg/dL 95   CALCIUM mg/dL 8.4*     Results from last 7 days   Lab Units 05/02/19  1119   ALK PHOS U/L 63   BILIRUBIN mg/dL 1.9*   ALT (SGPT) U/L 19   AST (SGOT) U/L 31             Results from last 7 days   Lab Units 05/02/19  1902   LACTATE mmol/L 1.9             Estimated Creatinine Clearance: 39.2 mL/min (A) (by C-G formula based on SCr of 1.13 mg/dL (H)).      Microbiology:  Blood Culture   Date Value Ref Range Status   05/02/2019 Staphylococcus, coagulase negative (A)  Final     Comment:     Probable contaminant requires clinical correlation, susceptibility not performed unless requested by physician.     05/02/2019 No growth at 24 hours  Preliminary     BCID, PCR   Date Value Ref Range Status   05/02/2019 (C) No organism detected by BCID PCR. Final    Staphylococcus spp, not aureus. Identification by BCID PCR.         RVP + for parainfluenza  Negative urine antigens    Radiology:  Imaging Results (last 72 hours)     Procedure Component Value Units Date/Time    CT Chest Without Contrast [[id number]] Collected:  05/02/19 1655     Updated:  05/03/19 8093    Narrative:       EXAMINATION: CT CHEST WO CONTRAST-05/02/2019:      INDICATION: Dyspnea; I50.9-Heart failure, unspecified; R09.02-Hypoxemia.      TECHNIQUE: 5 mm unenhanced images through the chest and upper abdomen.     The radiation dose reduction device was turned on for each scan per the  ALARA (As Low as Reasonably Achievable) protocol.     COMPARISON: NONE.     FINDINGS: History indicates dyspnea. Mediastinal window images show  shotty mediastinal lymph nodes but no significant adenopathy. The heart  is enlarged. Trace pericardial effusion. There are minimal pleural  effusions. Lung window images show patchy bibasilar interstitial  disease, peribronchial thickening, and minimal effusions. Appearance of  the lower lungs could reflect bronchitis or aspiration. No similar  changes are seen elsewhere. A few subpleural micronodules are present in  the lung apices, presumably old granulomatous disease. No significant  nodularity is seen elsewhere.     The included images of the upper abdomen show a water-density round 2.2  cm left lobe liver cyst, and a couple of other subcentimeter cysts. No  significant abnormalities are seen in the included portions of the  spleen, adrenal glands, or upper renal poles.       Impression:       1.  Moderate patchy bibasilar disease, small effusions and diffuse  peribronchial thickening. Consider pneumonia and aspiration. The patient  might have these findings with waxing and waning CHF as well.  2.  Cardiomegaly, trace pericardial effusion and shotty mediastinal  lymph nodes, nonspecific.  3.  Incidentally noted small hepatic cysts.     D:  05/02/2019  E:  05/02/2019           This report was finalized on 5/3/2019 10:49 PM by DR. Byron Monaco MD.       XR Chest 1 View [[id number]] Collected:  05/03/19 0832     Updated:  05/03/19 1732    Narrative:       EXAMINATION: XR CHEST 1 VW-      INDICATION: Pneumonia; I50.9-Heart failure, unspecified;  R09.02-Hypoxemia.      COMPARISON: 05/02/2019.     FINDINGS: Heart is enlarged. Vasculature appears upper limits of normal.  There is very mild bibasilar discoid atelectasis, stable on  the left and  new on the right. There is probably trace right effusion.           Impression:       Cardiomegaly and mild bibasilar discoid atelectasis. No  evidence of overt congestive failure.     D:  05/03/2019  E:  05/03/2019     This report was finalized on 5/3/2019 5:29 PM by DR. Byron Monaco MD.       XR Chest 1 View [[id number]] Collected:  05/02/19 1307     Updated:  05/02/19 2205    Narrative:       EXAMINATION: XR CHEST 1 VW-      INDICATION: Cough, shortness of air.      COMPARISON: None.     FINDINGS: Heart is enlarged. Vasculature is cephalized. Mild diffuse  interstitial changes are nonspecific but could represent early  interstitial edema. There may be minimal pleural effusion. No lung  consolidation or pneumothorax is seen.           Impression:       1. Cardiomegaly and mild pulmonary vascular congestion.  2. Mild diffuse interstitial disease, nonspecific. Very early  interstitial edema might have this appearance. Bronchitis could appear  similar. No focal pneumonia is identified.     D:  05/02/2019  E:  05/02/2019     This report was finalized on 5/2/2019 10:00 PM by DR. Byron Monaco MD.           5/3   ECHO Echocardiogram Findings     Left Ventricle Calculated EF = 23.0%. Estimated EF appears to be in the range of 21 - 25%. Estimated EF = 25%.   Pericardium The pericardium is normal. There is no evidence of pericardial effusion.     Personally reviewed above CT chest from admission      Impression:   1.  Parainfluenza 3 viral respiratory infection, pneumonia: sputum culture pending but likely with oral zhane only. Normal WBC and negative procalcitonin   2.  Afib, s/p Watchman's  3.  Chronic systolic heart failure, EF ~25%  4.  Single blood culture + for CNS from ED: most likely a skin contaminant, repeat cultures pending  4.  Thombocytopenia     PLAN/RECOMMENDATIONS:   Thank you for asking us to see Liz Lester, I recommend the following:    - procalcitonin checked, negative  - f/u pending blood  cultures and sputum culture    - No convincing evidence of bacterial infection, likely all viral.  - Discontinue antibiotics.   - Monitor clinically   - supportive care     Dr. Ortiz  has obtained the history, performed the physical exam and formulated the above treatment plan.     I have edited this note to reflect my history, exam, assessment and plan.  Jim Ortiz MD

## 2019-05-04 NOTE — PLAN OF CARE
Problem: Patient Care Overview  Goal: Plan of Care Review  Outcome: Ongoing (interventions implemented as appropriate)   05/04/19 1701   Coping/Psychosocial   Plan of Care Reviewed With patient   Plan of Care Review   Progress improving   OTHER   Outcome Summary Pt denied pain. Coughing frequently. O2 applied to maintain sats. No Bm since 5/1. New orders received.

## 2019-05-05 VITALS
WEIGHT: 172.1 LBS | HEIGHT: 67 IN | TEMPERATURE: 98.8 F | SYSTOLIC BLOOD PRESSURE: 92 MMHG | HEART RATE: 104 BPM | DIASTOLIC BLOOD PRESSURE: 77 MMHG | BODY MASS INDEX: 27.01 KG/M2 | RESPIRATION RATE: 18 BRPM | OXYGEN SATURATION: 93 %

## 2019-05-05 LAB
ANION GAP SERPL CALCULATED.3IONS-SCNC: 12 MMOL/L
BACTERIA SPEC RESP CULT: NORMAL
BUN BLD-MCNC: 24 MG/DL (ref 8–23)
BUN/CREAT SERPL: 23.1 (ref 7–25)
CALCIUM SPEC-SCNC: 8.5 MG/DL (ref 8.6–10.5)
CHLORIDE SERPL-SCNC: 95 MMOL/L (ref 98–107)
CO2 SERPL-SCNC: 26 MMOL/L (ref 22–29)
CREAT BLD-MCNC: 1.04 MG/DL (ref 0.57–1)
DEPRECATED RDW RBC AUTO: 49.4 FL (ref 37–54)
ERYTHROCYTE [DISTWIDTH] IN BLOOD BY AUTOMATED COUNT: 13.9 % (ref 12.3–15.4)
FERRITIN SERPL-MCNC: 158.4 NG/ML (ref 13–150)
FOLATE SERPL-MCNC: 13.2 NG/ML (ref 4.78–24.2)
GFR SERPL CREATININE-BSD FRML MDRD: 50 ML/MIN/1.73
GLUCOSE BLD-MCNC: 102 MG/DL (ref 65–99)
GRAM STN SPEC: NORMAL
HCT VFR BLD AUTO: 35.2 % (ref 34–46.6)
HGB BLD-MCNC: 11.9 G/DL (ref 12–15.9)
IRON 24H UR-MRATE: 28 MCG/DL (ref 37–145)
IRON SATN MFR SERPL: 10 % (ref 20–50)
MAGNESIUM SERPL-MCNC: 2.4 MG/DL (ref 1.6–2.4)
MCH RBC QN AUTO: 32.7 PG (ref 26.6–33)
MCHC RBC AUTO-ENTMCNC: 33.8 G/DL (ref 31.5–35.7)
MCV RBC AUTO: 96.7 FL (ref 79–97)
PLATELET # BLD AUTO: 165 10*3/MM3 (ref 140–450)
PMV BLD AUTO: 9.9 FL (ref 6–12)
POTASSIUM BLD-SCNC: 3.6 MMOL/L (ref 3.5–5.2)
RBC # BLD AUTO: 3.64 10*6/MM3 (ref 3.77–5.28)
SODIUM BLD-SCNC: 133 MMOL/L (ref 136–145)
TIBC SERPL-MCNC: 286 MCG/DL (ref 298–536)
TRANSFERRIN SERPL-MCNC: 192 MG/DL (ref 200–360)
VIT B12 BLD-MCNC: 1495 PG/ML (ref 211–946)
WBC NRBC COR # BLD: 5.71 10*3/MM3 (ref 3.4–10.8)

## 2019-05-05 PROCEDURE — 82607 VITAMIN B-12: CPT | Performed by: INTERNAL MEDICINE

## 2019-05-05 PROCEDURE — 83735 ASSAY OF MAGNESIUM: CPT | Performed by: INTERNAL MEDICINE

## 2019-05-05 PROCEDURE — 99231 SBSQ HOSP IP/OBS SF/LOW 25: CPT | Performed by: INTERNAL MEDICINE

## 2019-05-05 PROCEDURE — 85027 COMPLETE CBC AUTOMATED: CPT | Performed by: INTERNAL MEDICINE

## 2019-05-05 PROCEDURE — 25010000002 HEPARIN (PORCINE) PER 1000 UNITS: Performed by: HOSPITALIST

## 2019-05-05 PROCEDURE — 94799 UNLISTED PULMONARY SVC/PX: CPT

## 2019-05-05 PROCEDURE — 99239 HOSP IP/OBS DSCHRG MGMT >30: CPT | Performed by: INTERNAL MEDICINE

## 2019-05-05 PROCEDURE — 83540 ASSAY OF IRON: CPT | Performed by: INTERNAL MEDICINE

## 2019-05-05 PROCEDURE — 80048 BASIC METABOLIC PNL TOTAL CA: CPT | Performed by: INTERNAL MEDICINE

## 2019-05-05 PROCEDURE — 82728 ASSAY OF FERRITIN: CPT | Performed by: INTERNAL MEDICINE

## 2019-05-05 PROCEDURE — 82746 ASSAY OF FOLIC ACID SERUM: CPT | Performed by: INTERNAL MEDICINE

## 2019-05-05 PROCEDURE — 84466 ASSAY OF TRANSFERRIN: CPT | Performed by: INTERNAL MEDICINE

## 2019-05-05 RX ORDER — BISOPROLOL FUMARATE 5 MG/1
2.5 TABLET, FILM COATED ORAL
Qty: 15 TABLET | Refills: 0 | Status: SHIPPED | OUTPATIENT
Start: 2019-05-06 | End: 2019-06-05

## 2019-05-05 RX ORDER — GUAIFENESIN 600 MG/1
600 TABLET, EXTENDED RELEASE ORAL EVERY 12 HOURS SCHEDULED
Qty: 10 TABLET | Refills: 0 | Status: SHIPPED | OUTPATIENT
Start: 2019-05-05

## 2019-05-05 RX ADMIN — HEPARIN SODIUM 5000 UNITS: 5000 INJECTION INTRAVENOUS; SUBCUTANEOUS at 06:06

## 2019-05-05 RX ADMIN — ASPIRIN 81 MG: 81 TABLET, COATED ORAL at 09:14

## 2019-05-05 RX ADMIN — IPRATROPIUM BROMIDE AND ALBUTEROL SULFATE 3 ML: 2.5; .5 SOLUTION RESPIRATORY (INHALATION) at 07:29

## 2019-05-05 RX ADMIN — POLYETHYLENE GLYCOL 3350 17 G: 17 POWDER, FOR SOLUTION ORAL at 09:22

## 2019-05-05 RX ADMIN — SERTRALINE HYDROCHLORIDE 100 MG: 100 TABLET ORAL at 09:15

## 2019-05-05 RX ADMIN — POTASSIUM CHLORIDE 40 MEQ: 750 CAPSULE, EXTENDED RELEASE ORAL at 09:15

## 2019-05-05 RX ADMIN — SODIUM CHLORIDE, PRESERVATIVE FREE 3 ML: 5 INJECTION INTRAVENOUS at 09:18

## 2019-05-05 RX ADMIN — IPRATROPIUM BROMIDE AND ALBUTEROL SULFATE 3 ML: 2.5; .5 SOLUTION RESPIRATORY (INHALATION) at 12:07

## 2019-05-05 RX ADMIN — POTASSIUM CHLORIDE 40 MEQ: 750 CAPSULE, EXTENDED RELEASE ORAL at 14:13

## 2019-05-05 RX ADMIN — Medication 250 MG: at 09:14

## 2019-05-05 RX ADMIN — PANTOPRAZOLE SODIUM 40 MG: 40 TABLET, DELAYED RELEASE ORAL at 09:15

## 2019-05-05 RX ADMIN — LEVOTHYROXINE SODIUM 50 MCG: 50 TABLET ORAL at 06:06

## 2019-05-05 RX ADMIN — BISOPROLOL FUMARATE 2.5 MG: 5 TABLET ORAL at 09:14

## 2019-05-05 RX ADMIN — GUAIFENESIN 600 MG: 600 TABLET, EXTENDED RELEASE ORAL at 09:14

## 2019-05-05 NOTE — DISCHARGE SUMMARY
[facility] Medicine Services  DISCHARGE SUMMARY    Patient Name: Liz Lester  : 1934  MRN: [id number]    Date of Admission: 2019  Date of Discharge:  2019  Primary Care Physician: Provider, No Known    Consults     Date and Time Order Name Status Description    5/3/2019 1943 Inpatient Infectious Diseases Consult Completed     5/3/2019 0836 Inpatient Cardiology Consult Completed           Hospital Course     Presenting Problem:   Dyspnea [R06.00]  Acute on chronic congestive heart failure, unspecified heart failure type (CMS/HCC) [I50.9]    Active Hospital Problems    Diagnosis  POA   • **Parainfluenza virus pneumonia [J12.2]  Unknown     Priority: High   • Dyspnea [R06.00]  Yes     Priority: High   • Hypokalemia [E87.6]  Unknown     Priority: Medium   • CHF (congestive heart failure) (CMS/HCC) [I50.9]  Unknown     Priority: Medium   • Atrial fibrillation (CMS/HCC) [I48.91]  Unknown     Priority: Medium   • Acute on chronic congestive heart failure (CMS/HCC) [I50.9]  Yes     Priority: Medium   • Hypothyroidism (acquired) [E03.9]  Unknown     Priority: Low   • Depression [F32.9]  Unknown     Priority: Low      Resolved Hospital Problems   No resolved problems to display.          Hospital Course:  Liz Lester is a 85 y.o. female with PMH of Afib s/p Watchman Device, Hypothyroidism, depression, and CHF who presented to the ED with complaints of dyspnea and cough productive of yellow sputum.  Pt's proBNP significantly elevated on admission, so she was treated for volume overload/CHF.  She was ultimately found to have parainfluenza virus on respiratory PCR. Pt was initially placed on IV ABX for CAP coverage, however, cultures have remained negative and only + finding was parainfluenza.  She has been off ABX for 24 hours and has remained afebrile and with no leukocytosis. She did have one positive blood culture and ID was consulted due to initial suspicion this might be a  true pathogen, however, final cultures revealed this to be coag negative Staph c/w contaminant.  Remainder of cultures have been NGTD.      A TTE on admission showed an EF of 30%- pt reported being followed by Cardiology at home in [facility] but did not know her baseline EF.  Cards here was consulted. Her Coreg has been changed to low dose Bisoprolol- they were unable to titrate this dose any further as her blood pressure has been too low.  She will continue Bisoprolol at her current dose. Pt also will continue her Bumex at her prior home dose. She is now euvolemic and respiratory status has improved.    Of note, pt also has h/o Afib s/p Watchman device- she is not on anticoagulation due to GIB in the past. She will continue her home meds as noted below.     Pt also noted to have normocytic anemia- anemia workup showed that this was c/w AOCD.      Her TSH was wnl and Synthroid was continued at previous home dose.     She is anxious to return home today with family- she will return to [place] state once she feels well enough to do so.  She will need to follow up with her PCP upon return home within one week.       Discharge Follow Up Recommendations for labs/diagnostics:  With PCP within one week of return to home state    Day of Discharge     HPI:   Doing well this am, denies any issues overnight.     Review of Systems  Gen- No fevers, chills  CV- No chest pain, palpitations  Resp- No cough, dyspnea  GI- No N/V/D, abd pain    Otherwise ROS is negative except as mentioned in the HPI.    Vital Signs:   Temp:  [97.4 °F (36.3 °C)-98.8 °F (37.1 °C)] 98.8 °F (37.1 °C)  Heart Rate:  [102-123] 104  Resp:  [16-20] 20  BP: ()/(49-77) 92/77     Physical Exam:  Constitutional: No acute distress, awake, alert  HENT: NCAT, mucous membranes moist  Respiratory: Clear to auscultation bilaterally, respiratory effort normal   Cardiovascular: tachycardic- appears sinus with frequent PVCs on tele, no murmurs, rubs, or  gallops, palpable pedal pulses bilaterally  Gastrointestinal: Positive bowel sounds, soft, nontender, nondistended  Musculoskeletal: No bilateral ankle edema  Psychiatric: Appropriate affect, cooperative  Neurologic: Oriented x 3, strength symmetric in all extremities, Cranial Nerves grossly intact to confrontation, speech clear  Skin: No rashes  Pertinent  and/or Most Recent Results     Results from last 7 days   Lab Units 05/05/19  0417 05/04/19  0638 05/03/19  0235 05/02/19  1119   WBC 10*3/mm3 5.71  --  4.64 6.64   HEMOGLOBIN g/dL 11.9*  --  11.4* 13.1   HEMATOCRIT % 35.2  --  33.6* 39.8   PLATELETS 10*3/mm3 165  --  124* 133*   SODIUM mmol/L 133*  --  136 134*   POTASSIUM mmol/L 3.6 3.9 3.1* 3.2*   CHLORIDE mmol/L 95*  --  97* 96*   CO2 mmol/L 26.0  --  27.0 22.0   BUN mg/dL 24*  --  27* 32*   CREATININE mg/dL 1.04*  --  1.13* 1.12*   GLUCOSE mg/dL 102*  --  95 123*   CALCIUM mg/dL 8.5*  --  8.4* 9.0     Results from last 7 days   Lab Units 05/02/19  1119   BILIRUBIN mg/dL 1.9*   ALK PHOS U/L 63   ALT (SGPT) U/L 19   AST (SGOT) U/L 31           Invalid input(s): TG, LDLCALC, LDLREALC  Results from last 7 days   Lab Units 05/03/19  0235   TSH mIU/mL 3.620   HEMOGLOBIN A1C % 4.80       Brief Urine Lab Results     None          Microbiology Results Abnormal     Procedure Component Value - Date/Time    Respiratory Culture - Sputum, Cough [[id number]] Collected:  05/03/19 1206    Lab Status:  Final result Specimen:  Sputum from Cough Updated:  05/05/19 0913     Respiratory Culture Light growth (2+) Normal Respiratory Stacy     Gram Stain Many (4+) WBCs per low power field      Rare (1+) Epithelial cells per low power field      Rare (1+) Mixed bacterial morphotypes seen on Gram Stain    Blood Culture - Blood, Hand, Left [[id number]] Collected:  05/03/19 2047    Lab Status:  Preliminary result Specimen:  Blood from Hand, Left Updated:  05/04/19 2115     Blood Culture No growth at 24 hours    Blood Culture - Blood, Arm,  Right [[id number]] Collected:  05/03/19 2047    Lab Status:  Preliminary result Specimen:  Blood from Arm, Right Updated:  05/04/19 2115     Blood Culture No growth at 24 hours    Blood Culture - Blood, Arm, Right [[id number]] Collected:  05/02/19 1248    Lab Status:  Preliminary result Specimen:  Blood from Arm, Right Updated:  05/04/19 1315     Blood Culture No growth at 2 days    Blood Culture - Blood, Hand, Left [[id number]]  (Abnormal) Collected:  05/02/19 1248    Lab Status:  Final result Specimen:  Blood from Hand, Left Updated:  05/04/19 0634     Blood Culture Staphylococcus, coagulase negative     Comment: Probable contaminant requires clinical correlation, susceptibility not performed unless requested by physician.          Isolated from Anaerobic Bottle     Gram Stain Anaerobic Bottle Gram positive cocci in clusters    Blood Culture ID, PCR - Blood, Hand, Left [[id number]]  (Abnormal) Collected:  05/02/19 1248    Lab Status:  Final result Specimen:  Blood from Hand, Left Updated:  05/03/19 2217     BCID, PCR Staphylococcus spp, not aureus. Identification by BCID PCR.    S. Pneumo Ag Urine or CSF - Urine, Urine, Clean Catch [[id number]]  (Normal) Collected:  05/03/19 1524    Lab Status:  Final result Specimen:  Urine, Clean Catch Updated:  05/03/19 1855     Strep Pneumo Ag Negative    Legionella Antigen, Urine - Urine, Urine, Clean Catch [[id number]]  (Normal) Collected:  05/03/19 1524    Lab Status:  Final result Specimen:  Urine, Clean Catch Updated:  05/03/19 1855     LEGIONELLA ANTIGEN, URINE Negative    Respiratory Panel, PCR - Swab, Nasopharynx [[id number]]  (Abnormal) Collected:  05/02/19 1901    Lab Status:  Final result Specimen:  Swab from Nasopharynx Updated:  05/02/19 2007     ADENOVIRUS, PCR Not Detected     Coronavirus 229E Not Detected     Coronavirus HKU1 Not Detected     Coronavirus NL63 Not Detected     Coronavirus OC43 Not Detected     Human Metapneumovirus Not Detected     Human  Rhinovirus/Enterovirus Not Detected     Influenza B PCR Not Detected     Parainfluenza Virus 1 Not Detected     Parainfluenza Virus 2 Not Detected     Parainfluenza Virus 3 Detected     Parainfluenza Virus 4 Not Detected     Bordetella pertussis pcr Not Detected     Influenza A H1 2009 PCR Not Detected     Chlamydophila pneumoniae PCR Not Detected     Mycoplasma pneumo by PCR Not Detected     Influenza A PCR Not Detected     Influenza A H3 Not Detected     Influenza A H1 Not Detected     RSV, PCR Not Detected     Bordetella parapertussis PCR Not Detected          Imaging Results (all)     Procedure Component Value Units Date/Time    CT Chest Without Contrast [[id number]] Collected:  05/02/19 1655     Updated:  05/03/19 2253    Narrative:       EXAMINATION: CT CHEST WO CONTRAST-05/02/2019:      INDICATION: Dyspnea; I50.9-Heart failure, unspecified; R09.02-Hypoxemia.     TECHNIQUE: 5 mm unenhanced images through the chest and upper abdomen.     The radiation dose reduction device was turned on for each scan per the  ALARA (As Low as Reasonably Achievable) protocol.     COMPARISON: NONE.     FINDINGS: History indicates dyspnea. Mediastinal window images show  shotty mediastinal lymph nodes but no significant adenopathy. The heart  is enlarged. Trace pericardial effusion. There are minimal pleural  effusions. Lung window images show patchy bibasilar interstitial  disease, peribronchial thickening, and minimal effusions. Appearance of  the lower lungs could reflect bronchitis or aspiration. No similar  changes are seen elsewhere. A few subpleural micronodules are present in  the lung apices, presumably old granulomatous disease. No significant  nodularity is seen elsewhere.     The included images of the upper abdomen show a water-density round 2.2  cm left lobe liver cyst, and a couple of other subcentimeter cysts. No  significant abnormalities are seen in the included portions of the  spleen, adrenal glands, or upper  renal poles.       Impression:       1.  Moderate patchy bibasilar disease, small effusions and diffuse  peribronchial thickening. Consider pneumonia and aspiration. The patient  might have these findings with waxing and waning CHF as well.  2.  Cardiomegaly, trace pericardial effusion and shotty mediastinal  lymph nodes, nonspecific.  3.  Incidentally noted small hepatic cysts.     D:  05/02/2019  E:  05/02/2019           This report was finalized on 5/3/2019 10:49 PM by DR. Byron Monaco MD.       XR Chest 1 View [[id number]] Collected:  05/03/19 0832     Updated:  05/03/19 1732    Narrative:       EXAMINATION: XR CHEST 1 VW-      INDICATION: Pneumonia; I50.9-Heart failure, unspecified;  R09.02-Hypoxemia.      COMPARISON: 05/02/2019.     FINDINGS: Heart is enlarged. Vasculature appears upper limits of normal.  There is very mild bibasilar discoid atelectasis, stable on the left and  new on the right. There is probably trace right effusion.           Impression:       Cardiomegaly and mild bibasilar discoid atelectasis. No  evidence of overt congestive failure.     D:  05/03/2019  E:  05/03/2019     This report was finalized on 5/3/2019 5:29 PM by DR. Byron Monaco MD.       XR Chest 1 View [[id number]] Collected:  05/02/19 1307     Updated:  05/02/19 2203    Narrative:       EXAMINATION: XR CHEST 1 VW-      INDICATION: Cough, shortness of air.      COMPARISON: None.     FINDINGS: Heart is enlarged. Vasculature is cephalized. Mild diffuse  interstitial changes are nonspecific but could represent early  interstitial edema. There may be minimal pleural effusion. No lung  consolidation or pneumothorax is seen.           Impression:       1. Cardiomegaly and mild pulmonary vascular congestion.  2. Mild diffuse interstitial disease, nonspecific. Very early  interstitial edema might have this appearance. Bronchitis could appear  similar. No focal pneumonia is identified.     D:  05/02/2019  E:  05/02/2019     This report was  finalized on 5/2/2019 10:00 PM by DR. Byron Monaco MD.                       Results for orders placed during the hospital encounter of 05/02/19   Adult Transthoracic Echo Limited W/ Cont if Necessary Per Protocol    Narrative · Estimated EF = 25%.           Order Current Status    Folate In process    Vitamin B12 In process    Blood Culture - Blood, Arm, Right Preliminary result    Blood Culture - Blood, Arm, Right Preliminary result    Blood Culture - Blood, Hand, Left Preliminary result        Discharge Details        Discharge Medications      New Medications      Instructions Start Date   bisoprolol 2.5 MG half tablet  Commonly known as:  ZEBeta   2.5 mg, Oral, Every 24 Hours Scheduled   Start Date:  5/6/2019     guaiFENesin 600 MG 12 hr tablet  Commonly known as:  MUCINEX   600 mg, Oral, Every 12 Hours Scheduled         Continue These Medications      Instructions Start Date   aspirin 81 MG EC tablet   81 mg, Oral, Daily      bumetanide 1 MG tablet  Commonly known as:  BUMEX   0.5 mg, Oral, Daily      Fish Oil 1200 MG capsule delayed-release   1 capsule, Oral, Daily      gabapentin 100 MG capsule  Commonly known as:  NEURONTIN   200 mg, Oral, Nightly      levothyroxine 50 MCG tablet  Commonly known as:  SYNTHROID, LEVOTHROID   50 mcg, Oral, Daily      Magnesium 500 MG capsule   500 mg, Oral, Daily      pantoprazole 40 MG EC tablet  Commonly known as:  PROTONIX   40 mg, Oral, Daily      sertraline 100 MG tablet  Commonly known as:  ZOLOFT   100 mg, Oral, Daily      SLOW FE PO   40 mg, Oral, Daily      vitamin B-12 1000 MCG tablet  Commonly known as:  CYANOCOBALAMIN   1,000 mcg, Oral, Daily      VITAMIN D3 PO   50 mcg, Oral, Daily         Stop These Medications    carvedilol 6.25 MG tablet  Commonly known as:  COREG     hydrochlorothiazide 12.5 MG tablet  Commonly known as:  HYDRODIURIL            No Known Allergies      Discharge Disposition:  Home or Self Care    Discharge Diet:  Diet Order   Procedures   • Diet  Regular         Discharge Activity:         CODE STATUS:    Code Status and Medical Interventions:   Ordered at: 05/02/19 1413     Level Of Support Discussed With:    Patient     Code Status:    CPR     Medical Interventions (Level of Support Prior to Arrest):    Full         No future appointments.    Additional Instructions for the Follow-ups that You Need to Schedule     Discharge Follow-up with PCP   As directed       Currently Documented PCP:    Provider, No Known    PCP Phone Number:    None     Follow Up Details:  in one week with PCP               Time Spent on Discharge:  35 minutes    Electronically signed by Lucia Steiner MD, 05/05/19, 11:43 AM.

## 2019-05-05 NOTE — PROGRESS NOTES
INFECTIOUS DISEASE Progress note    Liz Lester  1934  [id number]    Admission Date: 5/2/2019    Evaluating Physician: Jim Ortiz MD    Reason for Consultation: Parainfluenza 3 viral pneumonia    History of present illness:    Patient is a 85 y.o. female, seen today for Parainfluenza 3 viral pneumonia.  She is from [place] and is here visiting family. She flew on a plane to get here. She presented to to the ED with complaints of worsening shortness of breath, cough and weakness since Monday.   She has been afebrile without leukocytosis.  A viral respiratory PCR positive for Parainfluenza 3, Legionella and Pneumococcus antigens negative.  Recent CXR with mild bibasilar discoid atelectasis, CT of chest patchy bibasilar disease, diffuse peribronchial thickening.  Vancomycin, Doxycycline and Rocephin initiated and we were consulted for evaluation and treatment.  Single blood cx from ED + for CONS. Feels slightly better today but still weak with significant cough.     5/5: Stable off antibiotics overnight.  She remained afebrile.  Plan discharge today.  Still has a cough which is occasionally productive but improved.  Stable off oxygen    ROS:  ROS:  No fevers or chills. No n/v/d. No rash. No new ADR to Abx.       No Known Allergies      Medication:    Current Facility-Administered Medications:   •  aspirin EC tablet 81 mg, 81 mg, Oral, Daily, Byron Botello MD, 81 mg at 05/05/19 0914  •  bisoprolol (ZEBeta) tablet 2.5 mg, 2.5 mg, Oral, Q24H, Marbin Wetzel MD, 2.5 mg at 05/05/19 0914  •  bumetanide (BUMEX) injection 1 mg, 1 mg, Intravenous, Daily, Marbin Wetzel MD, 1 mg at 05/04/19 1523  •  gabapentin (NEURONTIN) capsule 200 mg, 200 mg, Oral, Nightly, Lucia Steiner MD, 200 mg at 05/04/19 2126  •  guaiFENesin (MUCINEX) 12 hr tablet 600 mg, 600 mg, Oral, Q12H, Byron Botello MD, 600 mg at 05/05/19 0914  •  heparin (porcine) 5000 UNIT/ML injection 5,000 Units, 5,000 Units, Subcutaneous,  Q8H, Byron Botello MD, 5,000 Units at 05/05/19 0606  •  ipratropium-albuterol (DUO-NEB) nebulizer solution 3 mL, 3 mL, Nebulization, 4x Daily - RT, Byorn Botello MD, 3 mL at 05/05/19 1207  •  ipratropium-albuterol (DUO-NEB) nebulizer solution 3 mL, 3 mL, Nebulization, Q4H PRN, Byron Botello MD  •  levothyroxine (SYNTHROID, LEVOTHROID) tablet 50 mcg, 50 mcg, Oral, Q AM, Byron Botello MD, 50 mcg at 05/05/19 0606  •  Magnesium Sulfate 2 gram Bolus, followed by 8 gram infusion (total Mg dose 10 grams)- Mg less than or equal to 1mg/dL, 2 g, Intravenous, PRN **OR** Magnesium Sulfate 2 gram / 50mL Infusion (GIVE X 3 BAGS TO EQUAL 6GM TOTAL DOSE) - Mg 1.1 - 1.5 mg/dl, 2 g, Intravenous, PRN **OR** Magnesium Sulfate 4 gram infusion- Mg 1.6-1.9 mg/dL, 4 g, Intravenous, PRN, Byron Botello MD, Last Rate: 25 mL/hr at 05/04/19 1523, 4 g at 05/04/19 1523  •  pantoprazole (PROTONIX) EC tablet 40 mg, 40 mg, Oral, Daily, Byron Botello MD, 40 mg at 05/05/19 0915  •  polyethylene glycol 3350 powder (packet), 17 g, Oral, Daily PRN, Lucia Steiner MD, 17 g at 05/05/19 0922  •  potassium chloride (MICRO-K) CR capsule 40 mEq, 40 mEq, Oral, PRN, 40 mEq at 05/05/19 1413 **OR** potassium chloride (KLOR-CON) packet 40 mEq, 40 mEq, Oral, PRN, 40 mEq at 05/03/19 0930 **OR** potassium chloride 10 mEq in 100 mL IVPB, 10 mEq, Intravenous, Q1H PRN, Byron Botello MD  •  saccharomyces boulardii (FLORASTOR) capsule 250 mg, 250 mg, Oral, BID, Byron Botello MD, 250 mg at 05/05/19 0914  •  sertraline (ZOLOFT) tablet 100 mg, 100 mg, Oral, Daily, Byron Botello MD, 100 mg at 05/05/19 0915  •  sodium chloride 0.9 % flush 10 mL, 10 mL, Intravenous, PRN, Mike Verma MD  •  sodium chloride 0.9 % flush 3 mL, 3 mL, Intravenous, Q12H, Byron Botello MD, 3 mL at 05/05/19 0918  •  sodium chloride 0.9 % flush 3-10 mL, 3-10 mL, Intravenous, PRN, Byron Botello MD    Antibiotics:  Anti-Infectives (From admission, onward)     Ordered     Dose/Rate Route Frequency Start Stop    19  vancomycin 2000 mg/500 mL 0.9% NS IVPB ([facility])     Ordering Provider:  Lucia Steiner MD    25 mg/kg × 78.1 kg  over 120 Minutes Intravenous Once 19 0002    19 1155  azithromycin 500 MG/250 ML 0.9% NS IVPB ([facility])     Ordering Provider:  Layton Gutierrez PA    500 mg  over 60 Minutes Intravenous Once 19 1157 19 1458    19 1155  cefTRIAXone (ROCEPHIN) IVPB 1 g     Ordering Provider:  BrendaLayton parra PA    1 g  100 mL/hr over 30 Minutes Intravenous Once 19 1157 19 1330        Physical Exam:   Vital Signs  Temp (24hrs), Av.3 °F (36.8 °C), Min:97.4 °F (36.3 °C), Max:98.8 °F (37.1 °C)    Temp  Min: 97.4 °F (36.3 °C)  Max: 98.8 °F (37.1 °C)  BP  Min: 91/63  Max: 98/59  Pulse  Min: 102  Max: 117  Resp  Min: 18  Max: 20  SpO2  Min: 93 %  Max: 98 %    GENERAL: Awake and alert, in no acute distress.   HEENT: Normocephalic, atraumatic.  PERRL. EOMI. No conjunctival injection. No icterus. Oropharynx clear without evidence of thrush or exudate. No evidence of peridontal disease.    HEART: irreg rate/rhythm; No murmur, rubs, gallops.   LUNGS: wheezes improved. Non-productive cough.   ABDOMEN: Soft, nontender, nondistended. Positive bowel sounds. No rebound or guarding. NO mass or HSM.  EXT:  No cyanosis, clubbing or edema.    :    Without Bustos catheter.  MSK: FROM without joint effusions noted arms/legs.    SKIN: Warm and dry without cutaneous eruptions on Inspection/palpation.    NEURO: Oriented to PPT. No focal deficits on motor/sensory exam at arms/legs.  PSYCHIATRIC: Normal insight and judgement. Cooperative with PE    Laboratory Data    Results from last 7 days   Lab Units 19  0417 19  0235 19  1119   WBC 10*3/mm3 5.71 4.64 6.64   HEMOGLOBIN g/dL 11.9* 11.4* 13.1   HEMATOCRIT % 35.2 33.6* 39.8   PLATELETS 10*3/mm3 165 124* 133*     Results from last 7 days   Lab Units  05/05/19  0417   SODIUM mmol/L 133*   POTASSIUM mmol/L 3.6   CHLORIDE mmol/L 95*   CO2 mmol/L 26.0   BUN mg/dL 24*   CREATININE mg/dL 1.04*   GLUCOSE mg/dL 102*   CALCIUM mg/dL 8.5*     Results from last 7 days   Lab Units 05/02/19  1119   ALK PHOS U/L 63   BILIRUBIN mg/dL 1.9*   ALT (SGPT) U/L 19   AST (SGOT) U/L 31             Results from last 7 days   Lab Units 05/02/19  1902   LACTATE mmol/L 1.9             Estimated Creatinine Clearance: 42.6 mL/min (A) (by C-G formula based on SCr of 1.04 mg/dL (H)).      Microbiology:  Blood Culture   Date Value Ref Range Status   05/02/2019 Staphylococcus, coagulase negative (A)  Final     Comment:     Probable contaminant requires clinical correlation, susceptibility not performed unless requested by physician.     05/02/2019 No growth at 24 hours  Preliminary     BCID, PCR   Date Value Ref Range Status   05/02/2019 (C) No organism detected by BCID PCR. Final    Staphylococcus spp, not aureus. Identification by BCID PCR.         RVP + for parainfluenza  Negative urine antigens    Repeat blood cultures negative to date    Radiology:  Imaging Results (last 72 hours)     Procedure Component Value Units Date/Time    CT Chest Without Contrast [[id number]] Collected:  05/02/19 1655     Updated:  05/03/19 3543    Narrative:       EXAMINATION: CT CHEST WO CONTRAST-05/02/2019:      INDICATION: Dyspnea; I50.9-Heart failure, unspecified; R09.02-Hypoxemia.     TECHNIQUE: 5 mm unenhanced images through the chest and upper abdomen.     The radiation dose reduction device was turned on for each scan per the  ALARA (As Low as Reasonably Achievable) protocol.     COMPARISON: NONE.     FINDINGS: History indicates dyspnea. Mediastinal window images show  shotty mediastinal lymph nodes but no significant adenopathy. The heart  is enlarged. Trace pericardial effusion. There are minimal pleural  effusions. Lung window images show patchy bibasilar interstitial  disease, peribronchial thickening,  and minimal effusions. Appearance of  the lower lungs could reflect bronchitis or aspiration. No similar  changes are seen elsewhere. A few subpleural micronodules are present in  the lung apices, presumably old granulomatous disease. No significant  nodularity is seen elsewhere.     The included images of the upper abdomen show a water-density round 2.2  cm left lobe liver cyst, and a couple of other subcentimeter cysts. No  significant abnormalities are seen in the included portions of the  spleen, adrenal glands, or upper renal poles.       Impression:       1.  Moderate patchy bibasilar disease, small effusions and diffuse  peribronchial thickening. Consider pneumonia and aspiration. The patient  might have these findings with waxing and waning CHF as well.  2.  Cardiomegaly, trace pericardial effusion and shotty mediastinal  lymph nodes, nonspecific.  3.  Incidentally noted small hepatic cysts.     D:  05/02/2019  E:  05/02/2019           This report was finalized on 5/3/2019 10:49 PM by DR. Byron Monaco MD.       XR Chest 1 View [[id number]] Collected:  05/03/19 0832     Updated:  05/03/19 1732    Narrative:       EXAMINATION: XR CHEST 1 VW-      INDICATION: Pneumonia; I50.9-Heart failure, unspecified;  R09.02-Hypoxemia.      COMPARISON: 05/02/2019.     FINDINGS: Heart is enlarged. Vasculature appears upper limits of normal.  There is very mild bibasilar discoid atelectasis, stable on the left and  new on the right. There is probably trace right effusion.           Impression:       Cardiomegaly and mild bibasilar discoid atelectasis. No  evidence of overt congestive failure.     D:  05/03/2019  E:  05/03/2019     This report was finalized on 5/3/2019 5:29 PM by DR. Byron Monaco MD.       XR Chest 1 View [[id number]] Collected:  05/02/19 1307     Updated:  05/02/19 2203    Narrative:       EXAMINATION: XR CHEST 1 VW-      INDICATION: Cough, shortness of air.      COMPARISON: None.     FINDINGS: Heart is enlarged.  Vasculature is cephalized. Mild diffuse  interstitial changes are nonspecific but could represent early  interstitial edema. There may be minimal pleural effusion. No lung  consolidation or pneumothorax is seen.           Impression:       1. Cardiomegaly and mild pulmonary vascular congestion.  2. Mild diffuse interstitial disease, nonspecific. Very early  interstitial edema might have this appearance. Bronchitis could appear  similar. No focal pneumonia is identified.     D:  05/02/2019  E:  05/02/2019     This report was finalized on 5/2/2019 10:00 PM by DR. Byron Monaco MD.           5/3   ECHO Echocardiogram Findings     Left Ventricle Calculated EF = 23.0%. Estimated EF appears to be in the range of 21 - 25%. Estimated EF = 25%.   Pericardium The pericardium is normal. There is no evidence of pericardial effusion.     Personally reviewed above CT chest from admission      Impression:   1.  Parainfluenza 3 viral respiratory infection, pneumonia: sputum culture with oral zhane only. Normal WBC and negative procalcitonin   2.  Afib, s/p Watchman's  3.  Chronic systolic heart failure, EF ~25%  4.  Single blood culture + for CNS from ED: most likely a skin contaminant, repeat cultures, drawn prior to giving vancomycin are negative to date  4.  Thombocytopenia     PLAN/RECOMMENDATIONS:   Thank you for asking us to see Liz Lester, I recommend the following:    - f/u pending repeat blood cultures, although I expect these will remain negative    - No convincing evidence of bacterial infection, likely all viral.  - Stable off antibiotics  - Monitor clinically   - supportive care  - Okay with discharge home today from my perspective      Jim Ortiz MD

## 2019-05-05 NOTE — PROGRESS NOTES
"  [facility] at [facility]   Inpatient Progress Note       LOS: 3 days   Patient Care Team:  Provider, No Known as PCP - General    Chief Complaint:  CHF, cardiomyopathy    Subjective     Interval History:     Patient notes her cough is much more productive of sputum over the last 24 hours.  With this she feels a little bit better.  Wants to go home.    Review of Systems:   Pertinent positives noted in history, exam, and assessment. Otherwise reviewed and negative.      Objective     Vitals:  Blood pressure 92/77, pulse 104, temperature 98.8 °F (37.1 °C), temperature source Axillary, resp. rate 20, height 170.2 cm (67\"), weight 78.1 kg (172 lb 1.6 oz), SpO2 96 %.     Intake/Output Summary (Last 24 hours) at 5/5/2019 1109  Last data filed at 5/5/2019 0900  Gross per 24 hour   Intake 720 ml   Output 1400 ml   Net -680 ml     Physical Exam   Constitutional: She is oriented to person, place, and time. She appears well-developed and well-nourished.   HENT:   Mouth/Throat: Oropharynx is clear and moist.   Neck: No JVD present. Carotid bruit is not present. No thyromegaly present.   Cardiovascular: Regular rhythm, S1 normal, S2 normal and intact distal pulses. Exam reveals no gallop, no S3 and no S4.   Murmur heard.  Pulses:       Carotid pulses are 2+ on the right side, and 2+ on the left side.       Radial pulses are 2+ on the right side, and 2+ on the left side.   Pulmonary/Chest: She has no wheezes. She has rhonchi.   Abdominal: Soft. Bowel sounds are normal. She exhibits no mass. There is no tenderness.   Musculoskeletal: She exhibits no edema.   Neurological: She is alert and oriented to person, place, and time.   Skin: Skin is warm and dry. No rash noted.            Results Review:     I reviewed the patient's new clinical results.    Results from last 7 days   Lab Units 05/05/19  0417   WBC 10*3/mm3 5.71   HEMOGLOBIN g/dL 11.9*   HEMATOCRIT % 35.2   PLATELETS 10*3/mm3 165     Results from last 7 days "   Lab Units 05/05/19  0417  05/02/19  1119   SODIUM mmol/L 133*   < > 134*   POTASSIUM mmol/L 3.6   < > 3.2*   CHLORIDE mmol/L 95*   < > 96*   CO2 mmol/L 26.0   < > 22.0   BUN mg/dL 24*   < > 32*   CREATININE mg/dL 1.04*   < > 1.12*   CALCIUM mg/dL 8.5*   < > 9.0   BILIRUBIN mg/dL  --   --  1.9*   ALK PHOS U/L  --   --  63   ALT (SGPT) U/L  --   --  19   AST (SGOT) U/L  --   --  31   GLUCOSE mg/dL 102*   < > 123*    < > = values in this interval not displayed.     Results from last 7 days   Lab Units 05/05/19  0417   SODIUM mmol/L 133*   POTASSIUM mmol/L 3.6   CHLORIDE mmol/L 95*   CO2 mmol/L 26.0   BUN mg/dL 24*   CREATININE mg/dL 1.04*   GLUCOSE mg/dL 102*   CALCIUM mg/dL 8.5*         No results found for: TROPONINI  Results from last 7 days   Lab Units 05/03/19  0235   TSH mIU/mL 3.620         Results from last 7 days   Lab Units 05/02/19  1119   PROBNP pg/mL 18,779.0*         Tele:  Atrial fib    Assessment/Plan       Parainfluenza virus pneumonia    Dyspnea    CHF (congestive heart failure) (CMS/[facility])    Hypothyroidism (acquired)    Depression    Atrial fibrillation (CMS/[facility])    Acute on chronic congestive heart failure (CMS/[facility])    Hypokalemia      1. Cardiomyopathy   1. LVEF 30%.    2. Unclear previous LVEF by her outside cardiologist but per patient this may be new.    3. She notes several months of progressive dyspnea on exertion prior to this admission.    4. No known CAD.  2. Atrial fibrillation,   1. chronic with previous Watchman device,   2. intolerant to anticoagulation due to recurrent GI bleeds  3. Hypotension, chronic  4. Parainfluenza virus, symptoms most consistent with this.  This is the cause of her acute respiratory illness.      Plan:  Patient volume is improved, she is now having a productive cough again consistent with inflammatory respiratory process.  At this point her blood pressure still too low to initiate beta-blocker titration or new ACE/ARB.  Her heart rate is slowed down  significantly.  We will sign off, and she may be discharged home from a cardiovascular standpoint.  She needs to follow-up with cardiology in 1 to 2 weeks, and this can be done in [facility], her hometown with her previous cardiologist if she wishes.  I discussed with the patient that if she is still in [place] for the next 2 weeks, to call our office so we can see her before going home.        Marbin Wetzel MD      Dictated utilizing Dragon dictation

## 2019-05-06 ENCOUNTER — READMISSION MANAGEMENT (OUTPATIENT)
Dept: CALL CENTER | Facility: HOSPITAL | Age: 84
End: 2019-05-06

## 2019-05-06 NOTE — PAYOR COMM NOTE
"Auth #[id number]  Patient discharged home on 19.  Erin Matute RN CM   Phone [phone]  Fax [phone]    Liz Lester (85 y.o. Female)     Date of Birth Social Security Number Address Home Phone MRN    1934  [id number] [place] [address] [phone] [id number]    [employer] Marital Status          Voodoo        Admission Date Admission Type Admitting Provider Attending Provider Department, Room/Bed    19 Emergency Lucia Steiner MD  [address], S312/1    Discharge Date Discharge Disposition Discharge Destination        2019 Home or Self Care              Attending Provider:  (none)   Allergies:  No Known Allergies    Isolation:  Droplet   Infection:  None   Code Status:  Prior    Ht:  170.2 cm (67\")   Wt:  78.1 kg (172 lb 1.6 oz)    Admission Cmt:  None   Principal Problem:  Parainfluenza virus pneumonia [J12.2]                 Active Insurance as of 2019     Primary Coverage     Payor Plan Insurance Group Employer/Plan Group    MISC MEDICARE REPLACMENT MISC MCARE REPLACEMENT [id number]     Coverage Address Coverage Phone Number Coverage Fax Number Effective Dates    [address] [phone]  2019 - None Entered    [place] [address]       Subscriber Name Subscriber Birth Date Member ID       LIZ LESTER 1934 [id number]                 Emergency Contacts      (Rel.) Home Phone Work Phone Mobile Phone    Kody Lester (Son) [phone] -- [phone]               Discharge Summary      Lucia Steiner MD at 2019  9:43 AM              [facility] Medicine Services  DISCHARGE SUMMARY    Patient Name: Liz Lester  : 1934  MRN: [id number]    Date of Admission: 2019  Date of Discharge:  2019  Primary Care Physician: Provider, No Known    Consults     Date and Time Order Name Status Description    5/3/2019 1943 Inpatient Infectious Diseases Consult Completed     " 5/3/2019 0836 Inpatient Cardiology Consult Completed           Hospital Course     Presenting Problem:   Dyspnea [R06.00]  Acute on chronic congestive heart failure, unspecified heart failure type (CMS/HCC) [I50.9]    Active Hospital Problems    Diagnosis  POA   • **Parainfluenza virus pneumonia [J12.2]  Unknown     Priority: High   • Dyspnea [R06.00]  Yes     Priority: High   • Hypokalemia [E87.6]  Unknown     Priority: Medium   • CHF (congestive heart failure) (CMS/HCC) [I50.9]  Unknown     Priority: Medium   • Atrial fibrillation (CMS/HCC) [I48.91]  Unknown     Priority: Medium   • Acute on chronic congestive heart failure (CMS/HCC) [I50.9]  Yes     Priority: Medium   • Hypothyroidism (acquired) [E03.9]  Unknown     Priority: Low   • Depression [F32.9]  Unknown     Priority: Low      Resolved Hospital Problems   No resolved problems to display.          Hospital Course:  Liz Lester is a 85 y.o. female with PMH of Afib s/p Watchman Device, Hypothyroidism, depression, and CHF who presented to the ED with complaints of dyspnea and cough productive of yellow sputum.  Pt's proBNP significantly elevated on admission, so she was treated for volume overload/CHF.  She was ultimately found to have parainfluenza virus on respiratory PCR. Pt was initially placed on IV ABX for CAP coverage, however, cultures have remained negative and only + finding was parainfluenza.  She has been off ABX for 24 hours and has remained afebrile and with no leukocytosis. She did have one positive blood culture and ID was consulted due to initial suspicion this might be a true pathogen, however, final cultures revealed this to be coag negative Staph c/w contaminant.  Remainder of cultures have been NGTD.      A TTE on admission showed an EF of 30%- pt reported being followed by Cardiology at home in [facility] but did not know her baseline EF.  Cards here was consulted. Her Coreg has been changed to low dose Bisoprolol- they were  unable to titrate this dose any further as her blood pressure has been too low.  She will continue Bisoprolol at her current dose. Pt also will continue her Bumex at her prior home dose. She is now euvolemic and respiratory status has improved.    Of note, pt also has h/o Afib s/p Watchman device- she is not on anticoagulation due to GIB in the past. She will continue her home meds as noted below.     Pt also noted to have normocytic anemia- anemia workup showed that this was c/w AOCD.      Her TSH was wnl and Synthroid was continued at previous home dose.     She is anxious to return home today with family- she will return to [place] state once she feels well enough to do so.  She will need to follow up with her PCP upon return home within one week.       Discharge Follow Up Recommendations for labs/diagnostics:  With PCP within one week of return to home state    Day of Discharge     HPI:   Doing well this am, denies any issues overnight.     Review of Systems  Gen- No fevers, chills  CV- No chest pain, palpitations  Resp- No cough, dyspnea  GI- No N/V/D, abd pain    Otherwise ROS is negative except as mentioned in the HPI.    Vital Signs:   Temp:  [97.4 °F (36.3 °C)-98.8 °F (37.1 °C)] 98.8 °F (37.1 °C)  Heart Rate:  [102-123] 104  Resp:  [16-20] 20  BP: ()/(49-77) 92/77     Physical Exam:  Constitutional: No acute distress, awake, alert  HENT: NCAT, mucous membranes moist  Respiratory: Clear to auscultation bilaterally, respiratory effort normal   Cardiovascular: tachycardic- appears sinus with frequent PVCs on tele, no murmurs, rubs, or gallops, palpable pedal pulses bilaterally  Gastrointestinal: Positive bowel sounds, soft, nontender, nondistended  Musculoskeletal: No bilateral ankle edema  Psychiatric: Appropriate affect, cooperative  Neurologic: Oriented x 3, strength symmetric in all extremities, Cranial Nerves grossly intact to confrontation, speech clear  Skin: No rashes  Pertinent  and/or Most  Recent Results     Results from last 7 days   Lab Units 05/05/19  0417 05/04/19  0638 05/03/19  0235 05/02/19  1119   WBC 10*3/mm3 5.71  --  4.64 6.64   HEMOGLOBIN g/dL 11.9*  --  11.4* 13.1   HEMATOCRIT % 35.2  --  33.6* 39.8   PLATELETS 10*3/mm3 165  --  124* 133*   SODIUM mmol/L 133*  --  136 134*   POTASSIUM mmol/L 3.6 3.9 3.1* 3.2*   CHLORIDE mmol/L 95*  --  97* 96*   CO2 mmol/L 26.0  --  27.0 22.0   BUN mg/dL 24*  --  27* 32*   CREATININE mg/dL 1.04*  --  1.13* 1.12*   GLUCOSE mg/dL 102*  --  95 123*   CALCIUM mg/dL 8.5*  --  8.4* 9.0     Results from last 7 days   Lab Units 05/02/19  1119   BILIRUBIN mg/dL 1.9*   ALK PHOS U/L 63   ALT (SGPT) U/L 19   AST (SGOT) U/L 31           Invalid input(s): TG, LDLCALC, LDLREALC  Results from last 7 days   Lab Units 05/03/19  0235   TSH mIU/mL 3.620   HEMOGLOBIN A1C % 4.80       Brief Urine Lab Results     None          Microbiology Results Abnormal     Procedure Component Value - Date/Time    Respiratory Culture - Sputum, Cough [[id number]] Collected:  05/03/19 1206    Lab Status:  Final result Specimen:  Sputum from Cough Updated:  05/05/19 0913     Respiratory Culture Light growth (2+) Normal Respiratory Stacy     Gram Stain Many (4+) WBCs per low power field      Rare (1+) Epithelial cells per low power field      Rare (1+) Mixed bacterial morphotypes seen on Gram Stain    Blood Culture - Blood, Hand, Left [[id number]] Collected:  05/03/19 2047    Lab Status:  Preliminary result Specimen:  Blood from Hand, Left Updated:  05/04/19 2115     Blood Culture No growth at 24 hours    Blood Culture - Blood, Arm, Right [[id number]] Collected:  05/03/19 2047    Lab Status:  Preliminary result Specimen:  Blood from Arm, Right Updated:  05/04/19 2115     Blood Culture No growth at 24 hours    Blood Culture - Blood, Arm, Right [[id number]] Collected:  05/02/19 1248    Lab Status:  Preliminary result Specimen:  Blood from Arm, Right Updated:  05/04/19 1315     Blood Culture No growth  at 2 days    Blood Culture - Blood, Hand, Left [[id number]]  (Abnormal) Collected:  05/02/19 1248    Lab Status:  Final result Specimen:  Blood from Hand, Left Updated:  05/04/19 0634     Blood Culture Staphylococcus, coagulase negative     Comment: Probable contaminant requires clinical correlation, susceptibility not performed unless requested by physician.          Isolated from Anaerobic Bottle     Gram Stain Anaerobic Bottle Gram positive cocci in clusters    Blood Culture ID, PCR - Blood, Hand, Left [[id number]]  (Abnormal) Collected:  05/02/19 1248    Lab Status:  Final result Specimen:  Blood from Hand, Left Updated:  05/03/19 2217     BCID, PCR Staphylococcus spp, not aureus. Identification by BCID PCR.    S. Pneumo Ag Urine or CSF - Urine, Urine, Clean Catch [[id number]]  (Normal) Collected:  05/03/19 1524    Lab Status:  Final result Specimen:  Urine, Clean Catch Updated:  05/03/19 1855     Strep Pneumo Ag Negative    Legionella Antigen, Urine - Urine, Urine, Clean Catch [[id number]]  (Normal) Collected:  05/03/19 1524    Lab Status:  Final result Specimen:  Urine, Clean Catch Updated:  05/03/19 1855     LEGIONELLA ANTIGEN, URINE Negative    Respiratory Panel, PCR - Swab, Nasopharynx [[id number]]  (Abnormal) Collected:  05/02/19 1901    Lab Status:  Final result Specimen:  Swab from Nasopharynx Updated:  05/02/19 2007     ADENOVIRUS, PCR Not Detected     Coronavirus 229E Not Detected     Coronavirus HKU1 Not Detected     Coronavirus NL63 Not Detected     Coronavirus OC43 Not Detected     Human Metapneumovirus Not Detected     Human Rhinovirus/Enterovirus Not Detected     Influenza B PCR Not Detected     Parainfluenza Virus 1 Not Detected     Parainfluenza Virus 2 Not Detected     Parainfluenza Virus 3 Detected     Parainfluenza Virus 4 Not Detected     Bordetella pertussis pcr Not Detected     Influenza A H1 2009 PCR Not Detected     Chlamydophila pneumoniae PCR Not Detected     Mycoplasma pneumo by PCR Not  Detected     Influenza A PCR Not Detected     Influenza A H3 Not Detected     Influenza A H1 Not Detected     RSV, PCR Not Detected     Bordetella parapertussis PCR Not Detected          Imaging Results (all)     Procedure Component Value Units Date/Time    CT Chest Without Contrast [[id number]] Collected:  05/02/19 1655     Updated:  05/03/19 2253    Narrative:       EXAMINATION: CT CHEST WO CONTRAST-05/02/2019:      INDICATION: Dyspnea; I50.9-Heart failure, unspecified; R09.02-Hypoxemia.     TECHNIQUE: 5 mm unenhanced images through the chest and upper abdomen.     The radiation dose reduction device was turned on for each scan per the  ALARA (As Low as Reasonably Achievable) protocol.     COMPARISON: NONE.     FINDINGS: History indicates dyspnea. Mediastinal window images show  shotty mediastinal lymph nodes but no significant adenopathy. The heart  is enlarged. Trace pericardial effusion. There are minimal pleural  effusions. Lung window images show patchy bibasilar interstitial  disease, peribronchial thickening, and minimal effusions. Appearance of  the lower lungs could reflect bronchitis or aspiration. No similar  changes are seen elsewhere. A few subpleural micronodules are present in  the lung apices, presumably old granulomatous disease. No significant  nodularity is seen elsewhere.     The included images of the upper abdomen show a water-density round 2.2  cm left lobe liver cyst, and a couple of other subcentimeter cysts. No  significant abnormalities are seen in the included portions of the  spleen, adrenal glands, or upper renal poles.       Impression:       1.  Moderate patchy bibasilar disease, small effusions and diffuse  peribronchial thickening. Consider pneumonia and aspiration. The patient  might have these findings with waxing and waning CHF as well.  2.  Cardiomegaly, trace pericardial effusion and shotty mediastinal  lymph nodes, nonspecific.  3.  Incidentally noted small hepatic cysts.      D:  05/02/2019  E:  05/02/2019           This report was finalized on 5/3/2019 10:49 PM by DR. Byron Monaco MD.       XR Chest 1 View [[id number]] Collected:  05/03/19 0832     Updated:  05/03/19 1732    Narrative:       EXAMINATION: XR CHEST 1 VW-      INDICATION: Pneumonia; I50.9-Heart failure, unspecified;  R09.02-Hypoxemia.      COMPARISON: 05/02/2019.     FINDINGS: Heart is enlarged. Vasculature appears upper limits of normal.  There is very mild bibasilar discoid atelectasis, stable on the left and  new on the right. There is probably trace right effusion.           Impression:       Cardiomegaly and mild bibasilar discoid atelectasis. No  evidence of overt congestive failure.     D:  05/03/2019  E:  05/03/2019     This report was finalized on 5/3/2019 5:29 PM by DR. Byron Monaco MD.       XR Chest 1 View [[id number]] Collected:  05/02/19 1307     Updated:  05/02/19 2203    Narrative:       EXAMINATION: XR CHEST 1 VW-      INDICATION: Cough, shortness of air.      COMPARISON: None.     FINDINGS: Heart is enlarged. Vasculature is cephalized. Mild diffuse  interstitial changes are nonspecific but could represent early  interstitial edema. There may be minimal pleural effusion. No lung  consolidation or pneumothorax is seen.           Impression:       1. Cardiomegaly and mild pulmonary vascular congestion.  2. Mild diffuse interstitial disease, nonspecific. Very early  interstitial edema might have this appearance. Bronchitis could appear  similar. No focal pneumonia is identified.     D:  05/02/2019  E:  05/02/2019     This report was finalized on 5/2/2019 10:00 PM by DR. Byron Monaco MD.                       Results for orders placed during the hospital encounter of 05/02/19   Adult Transthoracic Echo Limited W/ Cont if Necessary Per Protocol    Narrative · Estimated EF = 25%.           Order Current Status    Folate In process    Vitamin B12 In process    Blood Culture - Blood, Arm, Right Preliminary result     Blood Culture - Blood, Arm, Right Preliminary result    Blood Culture - Blood, Hand, Left Preliminary result        Discharge Details        Discharge Medications      New Medications      Instructions Start Date   bisoprolol 2.5 MG half tablet  Commonly known as:  ZEBeta   2.5 mg, Oral, Every 24 Hours Scheduled   Start Date:  5/6/2019     guaiFENesin 600 MG 12 hr tablet  Commonly known as:  MUCINEX   600 mg, Oral, Every 12 Hours Scheduled         Continue These Medications      Instructions Start Date   aspirin 81 MG EC tablet   81 mg, Oral, Daily      bumetanide 1 MG tablet  Commonly known as:  BUMEX   0.5 mg, Oral, Daily      Fish Oil 1200 MG capsule delayed-release   1 capsule, Oral, Daily      gabapentin 100 MG capsule  Commonly known as:  NEURONTIN   200 mg, Oral, Nightly      levothyroxine 50 MCG tablet  Commonly known as:  SYNTHROID, LEVOTHROID   50 mcg, Oral, Daily      Magnesium 500 MG capsule   500 mg, Oral, Daily      pantoprazole 40 MG EC tablet  Commonly known as:  PROTONIX   40 mg, Oral, Daily      sertraline 100 MG tablet  Commonly known as:  ZOLOFT   100 mg, Oral, Daily      SLOW FE PO   40 mg, Oral, Daily      vitamin B-12 1000 MCG tablet  Commonly known as:  CYANOCOBALAMIN   1,000 mcg, Oral, Daily      VITAMIN D3 PO   50 mcg, Oral, Daily         Stop These Medications    carvedilol 6.25 MG tablet  Commonly known as:  COREG     hydrochlorothiazide 12.5 MG tablet  Commonly known as:  HYDRODIURIL            No Known Allergies      Discharge Disposition:  Home or Self Care    Discharge Diet:  Diet Order   Procedures   • Diet Regular         Discharge Activity:         CODE STATUS:    Code Status and Medical Interventions:   Ordered at: 05/02/19 1413     Level Of Support Discussed With:    Patient     Code Status:    CPR     Medical Interventions (Level of Support Prior to Arrest):    Full         No future appointments.    Additional Instructions for the Follow-ups that You Need to Schedule      Discharge Follow-up with PCP   As directed       Currently Documented PCP:    Provider, No Known    PCP Phone Number:    None     Follow Up Details:  in one week with PCP               Time Spent on Discharge:  35 minutes    Electronically signed by Lucia Steiner MD, 05/05/19, 11:43 AM.      Electronically signed by Lucia Steiner MD at 5/5/2019 11:50 AM

## 2019-05-07 LAB — BACTERIA SPEC AEROBE CULT: NORMAL

## 2019-05-07 NOTE — OUTREACH NOTE
Prep Survey      Responses   Facility patient discharged from?  [facility]   Is patient eligible?  Yes   Discharge diagnosis  parainfluenza virus, A/C CHF   Does the patient have one of the following disease processes/diagnoses(primary or secondary)?  CHF   Does the patient have Home health ordered?  No   Is there a DME ordered?  No   Prep survey completed?  Yes          Drea Sheldon RN

## 2019-05-08 ENCOUNTER — READMISSION MANAGEMENT (OUTPATIENT)
Dept: CALL CENTER | Facility: HOSPITAL | Age: 84
End: 2019-05-08

## 2019-05-08 LAB
BACTERIA SPEC AEROBE CULT: NORMAL
BACTERIA SPEC AEROBE CULT: NORMAL

## 2019-05-08 NOTE — OUTREACH NOTE
CHF Week 1 Survey      Responses   Facility patient discharged from?  [facility]   Does the patient have one of the following disease processes/diagnoses(primary or secondary)?  CHF   Is there a successful TCM telephone encounter documented?  No   CHF Week 1 attempt successful?  Yes   Call start time  1106   Call end time  1113   Discharge diagnosis  parainfluenza virus, A/C CHF   Is patient permission given to speak with other caregiver?  Yes   List who call center can speak with  son   Meds reviewed with patient/caregiver?  Yes   Is the patient having any side effects they believe may be caused by any medication additions or changes?  No   Does the patient have all medications ordered at discharge?  Yes   Is the patient taking all medications as directed (includes completed medication regime)?  Yes   Comments regarding appointments  no appointments were made, she will follow up in [place]. at her home.   Does the patient have a primary care provider?   Yes   Has the patient kept scheduled appointments due by today?  N/A   Has home health visited the patient within 72 hours of discharge?  N/A   Psychosocial issues?  No   Did the patient receive a copy of their discharge instructions?  Yes   Nursing interventions  Reviewed instructions with patient   What is the patient's perception of their health status since discharge?  Improving   Daily weight interventions  Education provided on importance of daily weight   Is the patient able to teach back Heart Failure diet management?  Yes   Is the patient able to teach back Heart Failure Zones?  Yes   Is the patient able to teach back signs and symptoms of worsening condition? (i.e. weight gain, shortness of air, etc.)  Yes   Is the patient/caregiver able to teach back the hierarchy of who to call/visit for symptoms/problems? PCP, Specialist, Home health nurse, Urgent Care, ED, 911  Yes    CHF Week 1 call completed?  Yes   Revoked  No further contact(revokes)-requires comment    Graduated/Revoked comments  Pt will return to [place] this week per her family. Pt will follow up with her pcp in [place].          Jolie Reese RN

## 2019-05-17 LAB
BH CV ECHO MEAS - BSA(HAYCOCK): 1.9 M^2
BH CV ECHO MEAS - BSA: 1.9 M^2
BH CV ECHO MEAS - BZI_BMI: 26.9 KILOGRAMS/M^2
BH CV ECHO MEAS - BZI_METRIC_HEIGHT: 170.2 CM
BH CV ECHO MEAS - BZI_METRIC_WEIGHT: 78 KG
BH CV ECHO MEAS - EDV(MOD-SP2): 105 ML
BH CV ECHO MEAS - EDV(MOD-SP4): 102 ML
BH CV ECHO MEAS - EF(MOD-BP): 23 %
BH CV ECHO MEAS - EF(MOD-SP2): 23.8 %
BH CV ECHO MEAS - EF(MOD-SP4): 20.6 %
BH CV ECHO MEAS - ESV(MOD-SP2): 80 ML
BH CV ECHO MEAS - ESV(MOD-SP4): 81 ML
BH CV ECHO MEAS - LV DIASTOLIC VOL/BSA (35-75): 53.8 ML/M^2
BH CV ECHO MEAS - LV SYSTOLIC VOL/BSA (12-30): 42.7 ML/M^2
BH CV ECHO MEAS - LVLD AP2: 7.8 CM
BH CV ECHO MEAS - LVLD AP4: 6.8 CM
BH CV ECHO MEAS - LVLS AP2: 7.5 CM
BH CV ECHO MEAS - LVLS AP4: 6.7 CM
BH CV ECHO MEAS - SI(MOD-SP2): 13.2 ML/M^2
BH CV ECHO MEAS - SI(MOD-SP4): 11.1 ML/M^2
BH CV ECHO MEAS - SV(MOD-SP2): 25 ML
BH CV ECHO MEAS - SV(MOD-SP4): 21 ML
LV EF 2D ECHO EST: 25 %
MAXIMAL PREDICTED HEART RATE: 135 BPM
STRESS TARGET HR: 115 BPM

## 2023-08-22 NOTE — PROGRESS NOTES
Pharmacy Consult-Vancomycin Dosing  Liz Lester is a  85 y.o. female receiving vancomycin therapy.     Indication:sepdid  Consulting Provider:  ID Consult:     Goal Trough: 15-20    Current Antimicrobial Therapy  Anti-Infectives (From admission, onward)      Ordered     Dose/Rate Route Frequency Start Stop    05/03/19 1948  vancomycin 1250 mg/250 mL 0.9% NS IVPB ([facility])     Ordering Provider:  Lucia Steiner MD    1,250 mg  over 90 Minutes Intravenous Every 24 Hours 05/04/19 2100 05/13/19 2059 05/03/19 1948  vancomycin 2000 mg/500 mL 0.9% NS IVPB ([facility])     Ordering Provider:  Lucia Steiner MD    25 mg/kg × 78.1 kg  over 120 Minutes Intravenous Once 05/03/19 2045 05/03/19 1943  Pharmacy to dose vancomycin     Ordering Provider:  Kyra Short DO     Does not apply Continuous PRN 05/03/19 1942 05/08/19 1941 05/02/19 1852  cefTRIAXone (ROCEPHIN) IVPB 1 g     Ordering Provider:  Byron Botello MD    1 g  100 mL/hr over 30 Minutes Intravenous Every 24 Hours 05/03/19 1300 05/10/19 1259    05/02/19 1830  doxycycline (MONODOX) capsule 100 mg     Ordering Provider:  Byron Botello MD    100 mg Oral Every 12 Hours Scheduled 05/02/19 2100 05/09/19 2059 05/02/19 1155  azithromycin 500 MG/250 ML 0.9% NS IVPB ([facility])     Ordering Provider:  Layton Gutierrez PA    500 mg  over 60 Minutes Intravenous Once 05/02/19 1157 05/02/19 1458    05/02/19 1155  cefTRIAXone (ROCEPHIN) IVPB 1 g     Ordering Provider:  Layton Gutierrez PA    1 g  100 mL/hr over 30 Minutes Intravenous Once 05/02/19 1157 05/02/19 1330            Allergies  Allergies as of 05/02/2019   • (No Known Allergies)       Labs    Results from last 7 days   Lab Units 05/03/19  0235 05/02/19  1119   BUN mg/dL 27* 32*   CREATININE mg/dL 1.13* 1.12*       Results from last 7 days   Lab Units 05/03/19  0235 05/02/19  1119   WBC 10*3/mm3 4.64 6.64       Evaluation of Dosing     Last Dose Received in the ED/Outside Facility:   Is Patient on  "Dialysis or Renal Replacement:     Ht - 170.2 cm (67\")  Wt - 78.1 kg (172 lb 1.6 oz)    Estimated Creatinine Clearance: 39.2 mL/min (A) (by C-G formula based on SCr of 1.13 mg/dL (H)).    Intake & Output (last 3 days)         05/01 0701 - 05/02 0700 05/02 0701 - 05/03 0700 05/03 0701 - 05/04 0700    IV Piggyback  300 50    Total Intake(mL/kg)  300 (3.8) 50 (0.6)    Urine (mL/kg/hr)  1800 700 (0.7)    Total Output  1800 700    Net  -1500 -650           Urine Unmeasured Occurrence   1 x            Microbiology and Radiology  Microbiology Results (last 10 days)       Procedure Component Value - Date/Time    S. Pneumo Ag Urine or CSF - Urine, Urine, Clean Catch [[id number]]  (Normal) Collected:  05/03/19 1524    Lab Status:  Final result Specimen:  Urine, Clean Catch Updated:  05/03/19 1855     Strep Pneumo Ag Negative    Legionella Antigen, Urine - Urine, Urine, Clean Catch [[id number]]  (Normal) Collected:  05/03/19 1524    Lab Status:  Final result Specimen:  Urine, Clean Catch Updated:  05/03/19 1855     LEGIONELLA ANTIGEN, URINE Negative    Respiratory Culture - Sputum, Cough [[id number]] Collected:  05/03/19 1206    Lab Status:  Preliminary result Specimen:  Sputum from Cough Updated:  05/03/19 1322     Gram Stain Many (4+) WBCs per low power field      Rare (1+) Epithelial cells per low power field      Rare (1+) Mixed bacterial morphotypes seen on Gram Stain    Respiratory Panel, PCR - Swab, Nasopharynx [[id number]]  (Abnormal) Collected:  05/02/19 1901    Lab Status:  Final result Specimen:  Swab from Nasopharynx Updated:  05/02/19 2007     ADENOVIRUS, PCR Not Detected     Coronavirus 229E Not Detected     Coronavirus HKU1 Not Detected     Coronavirus NL63 Not Detected     Coronavirus OC43 Not Detected     Human Metapneumovirus Not Detected     Human Rhinovirus/Enterovirus Not Detected     Influenza B PCR Not Detected     Parainfluenza Virus 1 Not Detected     Parainfluenza Virus 2 Not Detected     Parainfluenza " Pt. with TESFAYE and metabolic acidosis Virus 3 Detected     Parainfluenza Virus 4 Not Detected     Bordetella pertussis pcr Not Detected     Influenza A H1 2009 PCR Not Detected     Chlamydophila pneumoniae PCR Not Detected     Mycoplasma pneumo by PCR Not Detected     Influenza A PCR Not Detected     Influenza A H3 Not Detected     Influenza A H1 Not Detected     RSV, PCR Not Detected     Bordetella parapertussis PCR Not Detected    Blood Culture - Blood, Hand, Left [[id number]]  (Abnormal) Collected:  05/02/19 1248    Lab Status:  Preliminary result Specimen:  Blood from Hand, Left Updated:  05/03/19 1859     Blood Culture Abnormal Stain     Gram Stain Anaerobic Bottle Gram positive cocci in clusters    Blood Culture - Blood, Arm, Right [[id number]] Collected:  05/02/19 1248    Lab Status:  Preliminary result Specimen:  Blood from Arm, Right Updated:  05/03/19 1315     Blood Culture No growth at 24 hours            Evaluation of Level                           Assessment/Plan: Load 25 mg/kg for sepsis in 86 yo F w/crcl 39. Will follow with q24h 1250 mg. Trough goal 15-20 and is scheduled 5/5 2030.